# Patient Record
Sex: FEMALE | Race: WHITE | Employment: UNEMPLOYED | URBAN - METROPOLITAN AREA
[De-identification: names, ages, dates, MRNs, and addresses within clinical notes are randomized per-mention and may not be internally consistent; named-entity substitution may affect disease eponyms.]

---

## 2017-05-11 ENCOUNTER — OFFICE VISIT (OUTPATIENT)
Dept: OBGYN CLINIC | Age: 27
End: 2017-05-11

## 2017-05-11 VITALS
BODY MASS INDEX: 22.28 KG/M2 | SYSTOLIC BLOOD PRESSURE: 96 MMHG | DIASTOLIC BLOOD PRESSURE: 60 MMHG | WEIGHT: 118 LBS | RESPIRATION RATE: 19 BRPM | HEIGHT: 61 IN

## 2017-05-11 DIAGNOSIS — Z01.419 WELL FEMALE EXAM WITH ROUTINE GYNECOLOGICAL EXAM: Primary | ICD-10-CM

## 2017-05-11 DIAGNOSIS — D72.829 LEUKOCYTOSIS, UNSPECIFIED TYPE: ICD-10-CM

## 2017-05-11 RX ORDER — ESCITALOPRAM OXALATE 10 MG/1
10 TABLET ORAL DAILY
Status: ON HOLD | COMMUNITY
End: 2019-08-22

## 2017-05-11 NOTE — MR AVS SNAPSHOT
Visit Information Date & Time Provider Department Dept. Phone Encounter #  
 5/11/2017  1:00  S Daniel Colin, [address] Ricki Ferrera [phone] [id number] Upcoming Health Maintenance Date Due INFLUENZA AGE 9 TO ADULT 8/1/2017 PAP AKA CERVICAL CYTOLOGY 5/26/2019 Allergies as of 5/11/2017  Review Complete On: 5/11/2017 By: Sharon Matos No Known Allergies Current Immunizations  Reviewed on 9/4/2016 Name Date Tdap 7/8/2016, 12/6/2013 Not reviewed this visit You Were Diagnosed With   
  
 Codes Comments Well female exam with routine gynecological exam    -  Primary ICD-10-CM: U58.913 ICD-9-CM: V72.31 Vitals BP Resp Height(growth percentile) Weight(growth percentile) LMP BMI  
 96/60 (BP 1 Location: Left arm, BP Patient Position: Sitting) 19 5' 1\" (1.549 m) 118 lb (53.5 kg) 05/04/2017 22.3 kg/m2 OB Status Smoking Status Having regular periods Current Every Day Smoker BMI and BSA Data Body Mass Index Body Surface Area  
 22.3 kg/m 2 1.52 m 2 Preferred Pharmacy Pharmacy Name Phone [facility] [place], [address] [place] AT [facility] [phone] Your Updated Medication List  
  
   
This list is accurate as of: 5/11/17  1:19 PM.  Always use your most recent med list.  
  
  
  
  
 IRON (FERROUS SULFATE) PO Take  by mouth. LEXAPRO 10 mg tablet Generic drug:  escitalopram oxalate Take 10 mg by mouth daily. NEXPLANON 68 mg Impl Generic drug:  etonogestrel  
by SubDERmal route. norethindrone-ethinyl estradiol-iron 1.5 mg-30 mcg (21)/75 mg (7) Tab Commonly known as:  LOESTRIN FE 1.5/30 (28-DAY) Take 1 Tab by mouth daily. prenatal multivit-ca-min-fe-fa Tab Take  by mouth. WELLBUTRIN  mg SR tablet Generic drug:  buPROPion SR Take  by mouth two (2) times a day. XANAX 0.25 mg tablet Generic drug:  ALPRAZolam  
Take  by mouth. Introducing [facility]! Dear Litzy Cohn: Thank you for requesting a Rysto account. Our records indicate that you already have an active Rysto account. You can access your account anytime at [email or web address] Did you know that you can access your hospital and ER discharge instructions at any time in Rysto? You can also review all of your test results from your hospital stay or ER visit. Additional Information If you have questions, please visit the Frequently Asked Questions section of the Rysto website at [email or web address] Remember, Rysto is NOT to be used for urgent needs. For medical emergencies, dial 911. Now available from your iPhone and Android! Please provide this summary of care documentation to your next provider. Your primary care clinician is [facility]. If you have any questions after today's visit, please call [phone].

## 2017-05-11 NOTE — PROGRESS NOTES
Lillian Valerio is a ,  31 y.o. female [facility] whose No LMP recorded. Patient is not currently having periods (Reason: Nexplanon). was on 2017 who presents for her annual checkup. She is having no significant problems. With regard to the Gardisil vaccine, she is older than the FDA approved age to receive it. Menstrual status:    Her periods are spotting in flow. She is using three to five pads or tampons per day, irregular due to the nexplanon. She denies dysmenorrhea. She reports no premenstrual symptoms. Contraception:    The current method of family planning is nexplanon. Sexual history:    She  reports that she does not currently engage in sexual activity but has had male partners.; She reports using the following method of birth control/protection: None. Medical conditions:    Since her last annual GYN exam about one year ago, she has not the following changes in her health history:   Had leukocytosis during last pregnancy. Has not been rechecked postpartum. Pt's mom dx'd with SLE. Pap and Mammogram History:    Her most recent Pap smear was negative and HPV negative obtained 1 year(s) ago. The patient has never had a mammogram.    The patient does not have a family history of breast cancer. Past Medical History:   Diagnosis Date    Allergic to cats     Anemia     Anxiety and depression     managed by PCP    Disease of blood and blood forming organ     GERD (gastroesophageal reflux disease)     Postpartum depression     Screening for malignant neoplasm of the cervix 16    Negative (no hpv)    Stress     Susceptible to varicella (non-immune), currently pregnant 2016    vaccinate PP     Past Surgical History:   Procedure Laterality Date    HX WISDOM TEETH EXTRACTION      Also gum graph       Current Outpatient Prescriptions   Medication Sig Dispense Refill    etonogestrel (NEXPLANON) 68 mg impl by SubDERmal route.       escitalopram oxalate (LEXAPRO) 10 mg tablet Take 10 mg by mouth daily.  ALPRAZolam (XANAX) 0.25 mg tablet Take  by mouth.  IRON, FERROUS SULFATE, PO Take  by mouth.  prenatal multivit-ca-min-fe-fa tab Take  by mouth.  buPROPion SR (WELLBUTRIN SR) 150 mg SR tablet Take  by mouth two (2) times a day.  norethindrone-ethinyl estradiol-iron (LOESTRIN FE 1.5/30, 28-DAY,) 1.5 mg-30 mcg (21)/75 mg (7) tab Take 1 Tab by mouth daily. 84 Tab 2     Allergies: Review of patient's allergies indicates no known allergies. Social History     Social History    Marital status: SINGLE     Spouse name: N/A    Number of children: N/A    Years of education: N/A     Occupational History    Not on file. Social History Main Topics    Smoking status: Current Every Day Smoker     Packs/day: 0.25     Years: 11.00     Types: Cigarettes    Smokeless tobacco: Never Used      Comment: Smokes less than 10 cigs qd - Never used vapor or e-cigs     Alcohol use No    Drug use: No    Sexual activity: Not Currently     Partners: Male     Birth control/ protection: None     Other Topics Concern    Not on file     Social History Narrative     Tobacco History:  reports that she has been smoking Cigarettes. She has a 2.75 pack-year smoking history. She has never used smokeless tobacco.  Alcohol Abuse:  reports that she does not drink alcohol. Drug Abuse:  reports that she does not use illicit drugs.     Patient Active Problem List   Diagnosis Code    Leukocytosis D72.829       OB History    Para Term  AB SAB TAB Ectopic Multiple Living   2 2 2      0 2      # Outcome Date GA Lbr Adebayo/2nd Weight Sex Delivery Anes PTL Lv   2 Term 16 40w4d  6 lb 5.8 oz (2.885 kg) F Vag-Spont EPIDURAL AN N Y   1 Term 14 39w3d 13:50 / 01:03 6 lb 7.7 oz (2.94 kg) M VAGINAL DELI EPIDURAL AN  Y            Review of Systems - History obtained from the patient  Constitutional: negative for weight loss, fever, night sweats  HEENT: negative for hearing loss, earache, congestion, snoring, sorethroat  CV: negative for chest pain, palpitations, edema  Resp: negative for cough, shortness of breath, wheezing  GI: negative for change in bowel habits, abdominal pain, black or bloody stools  : negative for frequency, dysuria, hematuria, vaginal discharge  MSK: negative for back pain, joint pain, muscle pain  Breast: negative for breast lumps, nipple discharge, galactorrhea  Skin :negative for itching, rash, hives  Neuro: negative for dizziness, headache, confusion, weakness  Psych: negative for anxiety, depression, change in mood  Heme/lymph: negative for bleeding, bruising, pallor    Physical Exam    Visit Vitals    BP 96/60 (BP 1 Location: Left arm, BP Patient Position: Sitting)    Resp 19    Ht 5' 1\" (1.549 m)    Wt 118 lb (53.5 kg)    LMP 05/04/2017    BMI 22.3 kg/m2       Constitutional  · Appearance: well-nourished, well developed, alert, in no acute distress    HENT  · Head and Face: appears normal    Neck  · Inspection/Palpation: normal appearance, no masses or tenderness  · Lymph Nodes: no lymphadenopathy present    Chest  · Respiratory Effort: breathing normal    Breasts  · Inspection of Breasts: breasts symmetrical, no skin changes, no discharge present, nipple appearance normal, no skin retraction present  · Palpation of Breasts and Axillae: no masses present on palpation, no breast tenderness  · Axillary Lymph Nodes: no lymphadenopathy present    Extremities:  nexplanon palp LUE in proper position    Gastrointestinal  · Abdominal Examination: abdomen non-tender to palpation, normal bowel sounds, no masses present  · Liver and spleen: no hepatomegaly present, spleen not palpable  · Hernias: no hernias identified    Genitourinary  · External Genitalia: normal appearance for age, no discharge present, no tenderness present, no inflammatory lesions present, no masses present, no atrophy present  · Vagina: normal vaginal vault without central or paravaginal defects, no discharge present, no inflammatory lesions present, no masses present; minimal dark blood. · Bladder: non-tender to palpation  · Urethra: appears normal  · Cervix: normal   · Uterus: normal size, shape and consistency; RV  · Adnexa: no adnexal tenderness present, no adnexal masses present  · Perineum: perineum within normal limits, no evidence of trauma, no rashes or skin lesions present  · Anus: anus within normal limits, no hemorrhoids present  · Inguinal Lymph Nodes: no lymphadenopathy present    Skin  · General Inspection: no rash, no lesions identified    Neurologic/Psychiatric  · Mental Status:  · Orientation: grossly oriented to person, place and time  · Mood and Affect: mood normal, affect appropriate    . Assessment:  Routine gynecologic examination. Neg pap 5/2016  Her current medical status is satisfactory with no evidence of significant gynecologic issues.   Doing well with Nexplanon  H/o leukocytosis    Plan:  Counseled re: diet, exercise, healthy lifestyle  Return for yearly wellness visits  CBC with diff today

## 2017-05-12 LAB
BASOPHILS # BLD AUTO: 0.1 X10E3/UL (ref 0–0.2)
BASOPHILS NFR BLD AUTO: 1 %
EOSINOPHIL # BLD AUTO: 1.8 X10E3/UL (ref 0–0.4)
EOSINOPHIL NFR BLD AUTO: 16 %
ERYTHROCYTE [DISTWIDTH] IN BLOOD BY AUTOMATED COUNT: 13.7 % (ref 12.3–15.4)
HCT VFR BLD AUTO: 41.3 % (ref 34–46.6)
HGB BLD-MCNC: 13.4 G/DL (ref 11.1–15.9)
IMM GRANULOCYTES # BLD: 0 X10E3/UL (ref 0–0.1)
IMM GRANULOCYTES NFR BLD: 0 %
LYMPHOCYTES # BLD AUTO: 4 X10E3/UL (ref 0.7–3.1)
LYMPHOCYTES NFR BLD AUTO: 37 %
MCH RBC QN AUTO: 29 PG (ref 26.6–33)
MCHC RBC AUTO-ENTMCNC: 32.4 G/DL (ref 31.5–35.7)
MCV RBC AUTO: 89 FL (ref 79–97)
MONOCYTES # BLD AUTO: 0.7 X10E3/UL (ref 0.1–0.9)
MONOCYTES NFR BLD AUTO: 6 %
NEUTROPHILS # BLD AUTO: 4.3 X10E3/UL (ref 1.4–7)
NEUTROPHILS NFR BLD AUTO: 40 %
PLATELET # BLD AUTO: 213 X10E3/UL (ref 150–379)
RBC # BLD AUTO: 4.62 X10E6/UL (ref 3.77–5.28)
WBC # BLD AUTO: 11 X10E3/UL (ref 3.4–10.8)

## 2017-06-07 RX ORDER — TERCONAZOLE 4 MG/G
1 CREAM VAGINAL
Qty: 45 G | Refills: 0 | Status: SHIPPED | OUTPATIENT
Start: 2017-06-07 | End: 2018-05-14 | Stop reason: ALTCHOICE

## 2017-07-10 ENCOUNTER — OFFICE VISIT (OUTPATIENT)
Dept: OBGYN CLINIC | Age: 27
End: 2017-07-10

## 2017-07-10 VITALS
HEART RATE: 89 BPM | SYSTOLIC BLOOD PRESSURE: 122 MMHG | DIASTOLIC BLOOD PRESSURE: 73 MMHG | HEIGHT: 61 IN | WEIGHT: 114 LBS | BODY MASS INDEX: 21.52 KG/M2

## 2017-07-10 DIAGNOSIS — Z30.46 ENCOUNTER FOR NEXPLANON REMOVAL: Primary | ICD-10-CM

## 2017-07-10 DIAGNOSIS — Z30.09 COUNSELING FOR BIRTH CONTROL, ORAL CONTRACEPTIVES: ICD-10-CM

## 2017-07-10 DIAGNOSIS — Z32.02 NEGATIVE PREGNANCY TEST: ICD-10-CM

## 2017-07-10 LAB
HCG URINE, QL. (POC): NEGATIVE
VALID INTERNAL CONTROL?: YES

## 2017-07-10 RX ORDER — NORETHINDRONE ACETATE AND ETHINYL ESTRADIOL 1.5-30(21)
1 KIT ORAL DAILY
Qty: 3 PACKAGE | Refills: 3 | Status: SHIPPED | OUTPATIENT
Start: 2017-07-10 | End: 2018-05-14 | Stop reason: SDUPTHER

## 2017-07-10 NOTE — PATIENT INSTRUCTIONS
Learning About Birth Control: The Shot  What is the shot? The shot is used to prevent pregnancy. You get the shot in your upper arm or rear end (buttocks). The shot gives you a dose of the hormone progestin. The shot is often called by its brand name, Depo-Provera. Progestin prevents pregnancy in these ways: It thickens the mucus in the cervix. This makes it hard for sperm to travel into the uterus. It also thins the lining of the uterus, which makes it harder for a fertilized egg to attach to the uterus. Progestin can sometimes stop the ovaries from releasing an egg each month (ovulation). The shot provides birth control for 3 months at a time. You then need another shot. The shot can cause bone loss. Most women can use it safely for up to 2 years and then may choose to switch to another form of birth control. Some women may be able to use the shot for more than 2 years. How well does it work? In the first year of use:  · When the shot is used exactly as directed, fewer than 1 woman out of 100 has an unplanned pregnancy. · When the shot is not used exactly as directed, 6 women out of 100 have an unplanned pregnancy. Be sure to tell your doctor about any health problems you have or medicines you take. He or she can help you choose the birth control method that is right for you. What are the advantages of the shot? · The shot is one of the most effective methods of birth control. · It's convenient. You need to get a shot only once every 3 months to prevent pregnancy. You don't have to interrupt sex to protect against pregnancy. · It prevents pregnancy for 3 months at a time. You don't have to worry about birth control for this time. · It's safe to use while breastfeeding. · The shot may reduce heavy bleeding and cramping. · The shot doesn't contain estrogen. So you can use it if you don't want to take estrogen or can't take estrogen because you have certain health problems or concerns.   What are the disadvantages of the shot? · The shot doesn't protect against sexually transmitted infections (STIs), such as herpes or HIV/AIDS. If you aren't sure if your sex partner might have an STI, use a condom to protect against disease. · The shot may cause bone loss in some women. You shouldn't use this method for more than 2 years without talking to your doctor first about the risks and benefits. · Any side effects may last up to 3 months. ¨ The shot may cause irregular periods, or you may have spotting between periods. You may also stop getting a period. Some women see having no period as an advantage. ¨ It may cause mood changes, less interest in sex, or weight gain. · You must go to the doctor every 3 months to get the shot. · If you want to get pregnant, it may take 9 to 10 months after you stop getting the shot. This is because the hormones the shot provided have to leave your system, and your body has to readjust.  · If you have severe side effects, you have to wait for the hormones to get out of your system. This may take up to 3 months. Where can you learn more? Go to http://nolan-ibrahima.info/. Enter U806 in the search box to learn more about \"Learning About Birth Control: The Shot. \"  Current as of: March 16, 2017  Content Version: 11.3  © 9851-5665 [employer]wise, [employer]orporated. Care instructions adapted under license by [employer] (which disclaims liability or warranty for this information). If you have questions about a medical condition or this instruction, always ask your healthcare professional. [place] [address] any warranty or liability for your use of this information.

## 2017-07-10 NOTE — PROGRESS NOTES
Problem Visit-Complete    Chief Complaint   Implanon Removal (Nexplanon)      CHARLEY Miller is a 28 y.o. female who presents for the evaluation of Nexplanon. No LMP recorded. Patient has had an implant. Nexplanon placed 11/2016. The patient complains of non-stop bleeding and per her EX, reports patient has changed and become ''crazy\". Fighting a custody jacobs and EX desires for patient to have Nexplanon removed. He thinks it is the reason of their \"constant drama\". The symptoms are moderate - severe. Associated symptoms: none. Aggravating factors: none. Alleviating factors: none. Would like OCPs for cycle control. Past Medical History:   Diagnosis Date    Allergic to cats     Anemia     Anxiety and depression     managed by PCP    Disease of blood and blood forming organ     GERD (gastroesophageal reflux disease)     Postpartum depression     Screening for malignant neoplasm of the cervix 5/26/16    Negative (no hpv)    Stress     Susceptible to varicella (non-immune), currently pregnant 6/2016    vaccinate PP     Past Surgical History:   Procedure Laterality Date    HX WISDOM TEETH EXTRACTION  2012    Also gum graph     Social History     Occupational History    Not on file. Social History Main Topics    Smoking status: Current Every Day Smoker     Packs/day: 1.00     Years: 11.00     Types: Cigarettes    Smokeless tobacco: Never Used      Comment: Never used vapor or e-cigs     Alcohol use No    Drug use: No    Sexual activity: Yes     Partners: Male     Birth control/ protection: Rhythm, Implant     Family History   Problem Relation Age of Onset    Heart Attack Mother     Lupus Mother     Heart Attack Father      x 2    Heart Disease Father     Hypertension Father        No Known Allergies  Prior to Admission medications    Medication Sig Start Date End Date Taking? Authorizing Provider   escitalopram oxalate (LEXAPRO) 10 mg tablet Take 10 mg by mouth daily. Yes Historical Provider   ALPRAZolam (XANAX) 0.25 mg tablet Take  by mouth. Yes Historical Provider   IRON, FERROUS SULFATE, PO Take  by mouth. Yes Historical Provider   terconazole (TERAZOL 7) 0.4 % vaginal cream Insert 1 Applicator into vagina nightly. 6/7/17   Adiel Ramos MD   etonogestrel (NEXPLANON) 68 mg impl by SubDERmal route. Historical Provider   buPROPion SR [facility] SR) 150 mg SR tablet Take  by mouth two (2) times a day. Historical Provider   norethindrone-ethinyl estradiol-iron (LOESTRIN FE 1.5/30, 28-DAY,) 1.5 mg-30 mcg (21)/75 mg (7) tab Take 1 Tab by mouth daily. 10/20/16   Adiel Ramos MD   prenatal multivit-ca-min-fe-fa tab Take  by mouth. Historical Provider            Objective:  Visit Vitals    /73    Pulse 89    Ht 5' 1\" (1.549 m)    Wt 114 lb (51.7 kg)    Breastfeeding No    BMI 21.54 kg/m2       Physical Exam:     Constitutional  · Appearance: well-nourished, well developed, alert, in no acute distress    HENT  · Head and Face: appears normal      Breasts  · Inspection of Breasts: breasts symmetrical, no skin changes, no discharge present, nipple appearance normal, no skin retraction present  · Palpation of Breasts and Axillae: no masses present on palpation, no breast tenderness  · Axillary Lymph Nodes: no lymphadenopathy present    Extremities  · nexplanon palpated LUE    Skin  · General Inspection: no rash, no lesions identified    Neurologic/Psychiatric  · Mental Status:  · Orientation: grossly oriented to person, place and time  · Mood and Affect: mood normal, affect appropriate    Assessment:  Desires removal of Nexplanon  Would like OCPs for menstrual control. Has been on LE 1.5/30 in past without problems. Plan:   Nexplanon removed  eRX LE 1.5/30 #3 RFx3. Can begin today. Backup method until second pack. Risk and benefits reviewed, including thromboembolic events. Handout given.   AE 5/2018      Orders Placed This Encounter    AMB POC URINE PREGNANCY TEST, VISUAL COLOR COMPARISON    norethindrone-ethinyl estradiol-iron (LOESTRIN FE 1.5/30, 28-DAY,) 1.5 mg-30 mcg (21)/75 mg (7) tab     Sig: Take 1 Tab by mouth daily. Dispense:  3 Package     Refill:  3     . [facility]  OFFICE PROCEDURE PROGRESS NOTE        Chart reviewed for the following:   Noelle Perales, have reviewed the History, Physical and updated the Allergic reactions for Slipager 70 performed immediately prior to start of procedure:   Darion MCCORD, have performed the following reviews on [address] [facility] prior to the start of the procedure:            * Patient was identified by name and date of birth   * Agreement on procedure being performed was verified  * Risks and Benefits explained to the patient  * Procedure site verified and marked as necessary  * Patient was positioned for comfort  * Consent was signed and verified     Time: 976      Date of procedure: 7/10/2017    Procedure performed by:  Susan Rivas MD    Provider assisted by: Yefri Ramon MA    Patient assisted by: partner    How tolerated by patient: tolerated the procedure well with no complications    Post Procedural Pain Scale: 0 - No Hurt    Comments: none          Procedure note: Nexplanon/Implanon removal    [address] [facility] is a ,  28 y.o. female whose No LMP recorded. Patient has had an implant. .  She presents for office removal of a NEXPLANON/IMPLANON sub-dermal contraceptive implant. Procedure:  She was positioned so the site of her implant was visable and easily accessible. The implant was located by palpation. The end of the implant nearest the elbow was marked with a sterile marker. The area was prepped with an alcohol wipe and infiltrated with 3cc 1% lidocaine. Adequate time was allowed for anesthesia to take affect. The operative site was cleansed with Betadine and draped in a sterile fashion.     Downward pressure was applied on the end of the implant nearest the axilla and a 2-3mm incision was made in the longitudinal direction of the arm at the tip of the implant closest to the elbow. The implant was then pushed gently toward the incision until the tip was visible. The fibrous capsule was opened with a combination of blunt and sharp dissection. The implant was grasped with mosquito forceps and removed intact. It measured a full 4 cm in length. The skin was cleansed and dried. A steristrip was applied then topped with a folded 4x4 gauze and a Curlex pressure dressing. There were no complication or problems. She demonstrated full active range of movement of her elbow, wrist, all five digits and denied numbness and tingling. Post-procedure:  She was told to remove the dressing in 12-24 hours, to keep the incision area dry for 24 hours and to remove the Steristrip in 5-7 days. She was given our 24-hour phone number and encouraged to call if there are any problems. See progress note for family planning consultation.

## 2017-07-17 ENCOUNTER — PATIENT MESSAGE (OUTPATIENT)
Dept: OBGYN CLINIC | Age: 27
End: 2017-07-17

## 2017-07-18 ENCOUNTER — OFFICE VISIT (OUTPATIENT)
Dept: OBGYN CLINIC | Age: 27
End: 2017-07-18

## 2017-07-18 ENCOUNTER — PATIENT MESSAGE (OUTPATIENT)
Dept: OBGYN CLINIC | Age: 27
End: 2017-07-18

## 2017-07-18 VITALS
WEIGHT: 114 LBS | HEART RATE: 88 BPM | BODY MASS INDEX: 21.52 KG/M2 | SYSTOLIC BLOOD PRESSURE: 118 MMHG | HEIGHT: 61 IN | DIASTOLIC BLOOD PRESSURE: 62 MMHG

## 2017-07-18 DIAGNOSIS — N89.8 VAGINAL DISCHARGE: ICD-10-CM

## 2017-07-18 DIAGNOSIS — N90.89 VULVAR LESION: Primary | ICD-10-CM

## 2017-07-18 DIAGNOSIS — R39.89 BLADDER PAIN: ICD-10-CM

## 2017-07-18 LAB
PH BODY FLUID, POCT, PHBFPOCT: NORMAL
SOURCE POCT, SRCEPOCT: NORMAL
WET MOUNT POCT, WMPOCT: NEGATIVE

## 2017-07-18 RX ORDER — VALACYCLOVIR HYDROCHLORIDE 1 G/1
1000 TABLET, FILM COATED ORAL 2 TIMES DAILY
Qty: 20 TAB | Refills: 0 | Status: SHIPPED | OUTPATIENT
Start: 2017-07-18 | End: 2017-07-28

## 2017-07-18 NOTE — PROGRESS NOTES
Vaginitis evaluation    Chief Complaint   Vaginitis      HPI  28 y.o. female complains of white and creamy vaginal discharge for 8 days. No LMP recorded. She has additional symptoms at this time - red, swollen and in a lot of pain. Burns when urine hits the skin. The patient denies aggravating factors. She is not concerned about possible STI exposure at this time. She denies exposure to new chemicals ot hygenic agents  Previous treatment included: Terazol and monistat, no relief  Was on amoxicillin for tooth infection    No fever. No vaginal itching. Past Medical History:   Diagnosis Date    Allergic to cats     Anemia     Anxiety and depression     managed by PCP    Disease of blood and blood forming organ     GERD (gastroesophageal reflux disease)     Postpartum depression     Screening for malignant neoplasm of the cervix 5/26/16    Negative (no hpv)    Stress     Susceptible to varicella (non-immune), currently pregnant 6/2016    vaccinate PP     Past Surgical History:   Procedure Laterality Date    HX WISDOM TEETH EXTRACTION  2012    Also gum graph     Social History     Occupational History    Not on file. Social History Main Topics    Smoking status: Current Every Day Smoker     Packs/day: 1.00     Years: 11.00     Types: Cigarettes    Smokeless tobacco: Never Used      Comment: Never used vapor or e-cigs     Alcohol use No    Drug use: No    Sexual activity: Yes     Partners: Male     Birth control/ protection: Pill     Family History   Problem Relation Age of Onset    Heart Attack Mother     Lupus Mother     Heart Attack Father      x 2    Heart Disease Father     Hypertension Father         No Known Allergies  Prior to Admission medications    Medication Sig Start Date End Date Taking? Authorizing Provider   valACYclovir (VALTREX) 1 gram tablet Take 1 Tab by mouth two (2) times a day for 10 days.  7/18/17 7/28/17 Yes [address] Daniel Gonzales MD   norethindrone-ethinyl estradiol-iron (LOESTRIN FE 1.5/30, 28-DAY,) 1.5 mg-30 mcg (21)/75 mg (7) tab Take 1 Tab by mouth daily. 7/10/17  Yes Jayjay S Daniel Gonzales MD   escitalopram oxalate (LEXAPRO) 10 mg tablet Take 10 mg by mouth daily. Yes Historical Provider   ALPRAZolam (XANAX) 0.25 mg tablet Take  by mouth. Yes Historical Provider   IRON, FERROUS SULFATE, PO Take  by mouth. Yes Historical Provider   terconazole (TERAZOL 7) 0.4 % vaginal cream Insert 1 Applicator into vagina nightly. 6/7/17   [address] Daniel Gonzales MD   buPROPion SR [facility]) 150 mg SR tablet Take  by mouth two (2) times a day. Historical Provider   prenatal multivit-ca-min-fe-fa tab Take  by mouth.     Historical Provider                             Objective:    Visit Vitals    /62    Pulse 88    Ht 5' 1\" (1.549 m)    Wt 114 lb (51.7 kg)    Breastfeeding No    BMI 21.54 kg/m2       Physical Exam:   PHYSICAL EXAMINATION    Constitutional  · Appearance: well-nourished, well developed, alert, in no acute distress    HENT  · Head and Face: appears normal    Genitourinary  · External Genitalia:            · Vagina:  Scant white discharge present, otherwise normal vaginal vault without central or paravaginal defects, no inflammatory lesions present, no masses present  · Bladder: mildly tender to palpation  · Urethra: appears normal  · Cervix: normal   · Uterus: normal size, shape and consistency  · Adnexa: no adnexal tenderness present, no adnexal masses present  · Perineum: perineum within normal limits, no evidence of trauma, no rashes or skin lesions present  · Anus: anus within normal limits, no hemorrhoids present  · Inguinal Lymph Nodes: no lymphadenopathy present    Skin  · General Inspection: no rash, no lesions identified    Neurologic/Psychiatric  · Mental Status:  · Orientation: grossly oriented to person, place and time  · Mood and Affect: mood normal, affect appropriate          Results for orders placed or performed in visit on 07/18/17   AMB POC SMEAR, STAIN & INTERPRET, WET MOUNT   Result Value Ref Range    Wet mount (POC) negative     Narrative    WP/DERRICK    Hypae: negative  Buds: negative  Whiff: negative    Wet Prep:  Trich: negative  Clue cells: negative  Hyphae: negative  Buds: negative  WBC's: rare     AMB POC PH, BODY FLUID EXCEPT BLOOD   Result Value Ref Range    pH,Body fluid (POC) </= 4.5     Source         Assessment:   Vulvar lesions - suspect HSV  Bladder tenderness, mild  Vaginal discharge, scant    Plan:   - vulvar swab for HSV  - eRX Valtrex 1gm BID x7-10d  - RX written for lidocaine jelly  - HSV handouts given  - briefly discussed Valtrex for suppression vs episodic  - can schedule f/u for 2wks  - ur cx  - NuSwab Plus    Orders Placed This Encounter    CULTURE, URINE    HERPES SIMPLEX VIRUS (HSV) MIRI    NUSWAB VAGINITIS PLUS    AMB POC SMEAR, STAIN & INTERPRET, WET MOUNT    AMB POC PH, BODY FLUID EXCEPT BLOOD    valACYclovir (VALTREX) 1 gram tablet     Sig: Take 1 Tab by mouth two (2) times a day for 10 days.      Dispense:  20 Tab     Refill:  0

## 2017-07-19 NOTE — TELEPHONE ENCOUNTER
Leslie with [facility] calling regarding lidocaine jelly strength as the rx was hand written. Confirmed with nurse of DM 2%. Leslie notified.

## 2017-07-20 LAB
BACTERIA UR CULT: NO GROWTH
HSV1 DNA SPEC QL NAA+PROBE: NEGATIVE
HSV2 DNA SPEC QL NAA+PROBE: POSITIVE

## 2017-07-22 LAB
A VAGINAE DNA VAG QL NAA+PROBE: NORMAL SCORE
BVAB2 DNA VAG QL NAA+PROBE: NORMAL SCORE
C ALBICANS DNA VAG QL NAA+PROBE: NEGATIVE
C GLABRATA DNA VAG QL NAA+PROBE: NEGATIVE
C TRACH RRNA SPEC QL NAA+PROBE: NEGATIVE
MEGA1 DNA VAG QL NAA+PROBE: NORMAL SCORE
N GONORRHOEA RRNA SPEC QL NAA+PROBE: NEGATIVE
T VAGINALIS RRNA SPEC QL NAA+PROBE: NEGATIVE

## 2017-07-25 ENCOUNTER — OFFICE VISIT (OUTPATIENT)
Dept: OBGYN CLINIC | Age: 27
End: 2017-07-25

## 2017-07-25 VITALS
WEIGHT: 114 LBS | HEIGHT: 61 IN | DIASTOLIC BLOOD PRESSURE: 63 MMHG | BODY MASS INDEX: 21.52 KG/M2 | SYSTOLIC BLOOD PRESSURE: 115 MMHG | HEART RATE: 75 BPM

## 2017-07-25 DIAGNOSIS — A60.04 HERPES SIMPLEX VULVOVAGINITIS: Primary | ICD-10-CM

## 2017-07-25 RX ORDER — VALACYCLOVIR HYDROCHLORIDE 1 G/1
1000 TABLET, FILM COATED ORAL DAILY
Qty: 30 TAB | Refills: 6 | Status: SHIPPED | OUTPATIENT
Start: 2017-07-25 | End: 2017-08-11 | Stop reason: SDUPTHER

## 2017-07-25 NOTE — PROGRESS NOTES
Vaginitis evaluation    Chief Complaint   Vaginitis      HPI  28 y.o. female complains of lesions on buttocks. .Patient's last menstrual period was 07/22/2017. Seen last week for primary HSV infection. NuSwab confirmed HSV-2. Given RX for Valtrex 1mg BID x10d, still taking. Vulvar lesions have improved, but now with scattered lesions over buttocks - she can't see them, wondering if they are also HSV. Lesions are painful. Has been using lidocaine jelly and sitz baths which give only partial relief. Past Medical History:   Diagnosis Date    Allergic to cats     Anemia     Anxiety and depression     managed by PCP    Disease of blood and blood forming organ     GERD (gastroesophageal reflux disease)     Postpartum depression     Screening for malignant neoplasm of the cervix 5/26/16    Negative (no hpv)    Stress     Susceptible to varicella (non-immune), currently pregnant 6/2016    vaccinate PP     Past Surgical History:   Procedure Laterality Date    HX WISDOM TEETH EXTRACTION  2012    Also gum graph     Social History     Occupational History    Not on file. Social History Main Topics    Smoking status: Current Every Day Smoker     Packs/day: 1.00     Years: 11.00     Types: Cigarettes    Smokeless tobacco: Never Used      Comment: Never used vapor or e-cigs     Alcohol use No    Drug use: No    Sexual activity: Yes     Partners: Male     Birth control/ protection: Pill     Family History   Problem Relation Age of Onset    Heart Attack Mother     Lupus Mother     Heart Attack Father      x 2    Heart Disease Father     Hypertension Father         No Known Allergies  Prior to Admission medications    Medication Sig Start Date End Date Taking? Authorizing Provider   valACYclovir (VALTREX) 1 gram tablet Take 1 Tab by mouth two (2) times a day for 10 days.  7/18/17 7/28/17 Yes Marika Sapp MD   norethindrone-ethinyl estradiol-iron (LOESTRIN FE 1.5/30, 28-DAY,) 1.5 mg-30 mcg (21)/75 mg (7) tab Take 1 Tab by mouth daily. 7/10/17  Yes [address] Daniel Gonzales MD   ALPRAZolam [facility]) 0.25 mg tablet Take  by mouth. Yes Historical Provider   IRON, FERROUS SULFATE, PO Take  by mouth. Yes Historical Provider   terconazole (TERAZOL 7) 0.4 % vaginal cream Insert 1 Applicator into vagina nightly. 6/7/17   [address] Daniel Gonzales MD   escitalopram oxalate (LEXAPRO) 10 mg tablet Take 10 mg by mouth daily. Historical Provider   buPROPion SR [facility] SR) 150 mg SR tablet Take  by mouth two (2) times a day. Historical Provider   prenatal multivit-ca-min-fe-fa tab Take  by mouth. Historical Provider                             Objective:    Visit Vitals    /63    Pulse 75    Ht 5' 1\" (1.549 m)    Wt 114 lb (51.7 kg)    LMP 07/22/2017    Breastfeeding No    BMI 21.54 kg/m2       Physical Exam:   PHYSICAL EXAMINATION    Constitutional  · Appearance: well-nourished, well developed, alert, in no acute distress    HENT  · Head and Face: appears normal    Genitourinary  · Scattered shallow ulcerative lesions over buttocks bilaterally c/w HSV. Vulvar lesions nearly completely healed      Neurologic/Psychiatric  · Mental Status:  · Orientation: grossly oriented to person, place and time  · Mood and Affect: mood normal, affect appropriate        Assessment:   Primary HSV    Plan:   - complete current course of Valtrex BID  - discussed HSV transmission, natural course, recurrent outbreaks, concerns with pregnancy, episodic vs suppresive tx  - will continue valtrex daily for suppression once completes BID course. Orders Placed This Encounter    valACYclovir (VALTREX) 1 gram tablet     Sig: Take 1 Tab by mouth daily. Dispense:  30 Tab     Refill:  6     .

## 2017-07-25 NOTE — PATIENT INSTRUCTIONS

## 2017-08-11 RX ORDER — VALACYCLOVIR HYDROCHLORIDE 1 G/1
TABLET, FILM COATED ORAL
Qty: 90 TAB | Refills: 3 | Status: SHIPPED | OUTPATIENT
Start: 2017-08-11 | End: 2018-05-14 | Stop reason: DRUGHIGH

## 2017-10-30 ENCOUNTER — APPOINTMENT (OUTPATIENT)
Dept: CT IMAGING | Age: 27
End: 2017-10-30
Attending: EMERGENCY MEDICINE
Payer: MEDICAID

## 2017-10-30 ENCOUNTER — HOSPITAL ENCOUNTER (EMERGENCY)
Age: 27
Discharge: HOME | End: 2017-10-30
Attending: STUDENT IN AN ORGANIZED HEALTH CARE EDUCATION/TRAINING PROGRAM | Admitting: EMERGENCY MEDICINE
Payer: MEDICAID

## 2017-10-30 VITALS
DIASTOLIC BLOOD PRESSURE: 75 MMHG | TEMPERATURE: 97.9 F | HEART RATE: 94 BPM | SYSTOLIC BLOOD PRESSURE: 112 MMHG | OXYGEN SATURATION: 98 % | WEIGHT: 105 LBS | HEIGHT: 61 IN | RESPIRATION RATE: 18 BRPM | BODY MASS INDEX: 19.83 KG/M2

## 2017-10-30 DIAGNOSIS — M54.50 BILATERAL LOW BACK PAIN WITHOUT SCIATICA, UNSPECIFIED CHRONICITY: Primary | ICD-10-CM

## 2017-10-30 LAB
APPEARANCE UR: CLEAR
BACTERIA URNS QL MICRO: NEGATIVE /HPF
BILIRUB UR QL: NEGATIVE
COLOR UR: NORMAL
EPITH CASTS URNS QL MICRO: NORMAL /LPF
GLUCOSE UR STRIP.AUTO-MCNC: NEGATIVE MG/DL
HCG UR QL: NEGATIVE
HGB UR QL STRIP: NEGATIVE
HYALINE CASTS URNS QL MICRO: NORMAL /LPF (ref 0–5)
KETONES UR QL STRIP.AUTO: NEGATIVE MG/DL
LEUKOCYTE ESTERASE UR QL STRIP.AUTO: NEGATIVE
NITRITE UR QL STRIP.AUTO: NEGATIVE
PH UR STRIP: 5.5 [PH] (ref 5–8)
PROT UR STRIP-MCNC: NEGATIVE MG/DL
RBC #/AREA URNS HPF: NORMAL /HPF (ref 0–5)
SP GR UR REFRACTOMETRY: 1.02 (ref 1–1.03)
UR CULT HOLD, URHOLD: NORMAL
UROBILINOGEN UR QL STRIP.AUTO: 0.2 EU/DL (ref 0.2–1)
WBC URNS QL MICRO: NORMAL /HPF (ref 0–4)

## 2017-10-30 PROCEDURE — 74176 CT ABD & PELVIS W/O CONTRAST: CPT

## 2017-10-30 PROCEDURE — 81025 URINE PREGNANCY TEST: CPT

## 2017-10-30 PROCEDURE — 81001 URINALYSIS AUTO W/SCOPE: CPT | Performed by: STUDENT IN AN ORGANIZED HEALTH CARE EDUCATION/TRAINING PROGRAM

## 2017-10-30 PROCEDURE — 99283 EMERGENCY DEPT VISIT LOW MDM: CPT

## 2017-10-30 NOTE — ED NOTES
Pt c/o lower back pain x months. Has seen her PCP, Dr Slim Nino. Had xray and ultrasound. Given oxycodone. Also seen at [facility] 2 weeks ago. Given abx for UTI and percocet. Ran out of pain meds 3 days ago.

## 2017-10-30 NOTE — DISCHARGE INSTRUCTIONS
Back Pain: Care Instructions  Your Care Instructions    Back pain has many possible causes. It is often related to problems with muscles and ligaments of the back. It may also be related to problems with the nerves, discs, or bones of the back. Moving, lifting, standing, sitting, or sleeping in an awkward way can strain the back. Sometimes you don't notice the injury until later. Arthritis is another common cause of back pain. Although it may hurt a lot, back pain usually improves on its own within several weeks. Most people recover in 12 weeks or less. Using good home treatment and being careful not to stress your back can help you feel better sooner. Follow-up care is a key part of your treatment and safety. Be sure to make and go to all appointments, and call your doctor if you are having problems. It's also a good idea to know your test results and keep a list of the medicines you take. How can you care for yourself at home? · Sit or lie in positions that are most comfortable and reduce your pain. Try one of these positions when you lie down:  ¨ Lie on your back with your knees bent and supported by large pillows. ¨ Lie on the floor with your legs on the seat of a sofa or chair. [place] on your side with your knees and hips bent and a pillow between your legs. ¨ Lie on your stomach if it does not make pain worse. · Do not sit up in bed, and avoid soft couches and twisted positions. Bed rest can help relieve pain at first, but it delays healing. Avoid bed rest after the first day of back pain. · Change positions every 30 minutes. If you must sit for long periods of time, take breaks from sitting. Get up and walk around, or lie in a comfortable position. · Try using a heating pad on a low or medium setting for 15 to 20 minutes every 2 or 3 hours. Try a warm shower in place of one session with the heating pad. · You can also try an ice pack for 10 to 15 minutes every 2 to 3 hours.  Put a thin cloth between the ice pack and your skin. · Take pain medicines exactly as directed. ¨ If the doctor gave you a prescription medicine for pain, take it as prescribed. ¨ If you are not taking a prescription pain medicine, ask your doctor if you can take an over-the-counter medicine. · Take short walks several times a day. You can start with 5 to 10 minutes, 3 or 4 times a day, and work up to longer walks. Walk on level surfaces and avoid hills and stairs until your back is better. · Return to work and other activities as soon as you can. Continued rest without activity is usually not good for your back. · To prevent future back pain, do exercises to stretch and strengthen your back and stomach. Learn how to use good posture, safe lifting techniques, and proper body mechanics. When should you call for help? Call your doctor now or seek immediate medical care if:  ? · You have new or worsening numbness in your legs. ? · You have new or worsening weakness in your legs. (This could make it hard to stand up.)   ? · You lose control of your bladder or bowels. ? Watch closely for changes in your health, and be sure to contact your doctor if:  ? · Your pain gets worse. ? · You are not getting better after 2 weeks. Where can you learn more? Go to http://nolan-ibrahima.info/. Enter Q656 in the search box to learn more about \"Back Pain: Care Instructions. \"  Current as of: March 21, 2017  Content Version: 11.4  © 6759-2393 [employer]. Care instructions adapted under license by [employer] (which disclaims liability or warranty for this information). If you have questions about a medical condition or this instruction, always ask your healthcare professional. [place] [address] any warranty or liability for your use of this information. We hope that we have addressed all of your medical concerns.  The examination and treatment you received in the Emergency Department were for an emergent problem and were not intended as complete care. It is important that you follow up with your healthcare provider(s) for ongoing care. If your symptoms worsen or do not improve as expected, and you are unable to reach your usual health care provider(s), you should return to the Emergency Department. Today's healthcare is undergoing tremendous change, and patient satisfaction surveys are one of the many tools to assess the quality of medical care. You may receive a survey from the [employer] organization regarding your experience in the Emergency Department. I hope that your experience has been completely positive, particularly the medical care that I provided. As such, please participate in the survey; anything less than excellent does not meet my expectations or intentions. [facility] and [facility] participate in nationally recognized quality of care measures. If your blood pressure is greater than 120/80, as reported below, we urge that you seek medical care to address the potential of high blood pressure, commonly known as hypertension. Hypertension can be hereditary or can be caused by certain medical conditions, pain, stress, or \"white coat syndrome. \"       Please make an appointment with your health care provider(s) for follow up of your Emergency Department visit. VITALS:   Patient Vitals for the past 8 hrs:   Temp Pulse Resp BP SpO2   10/30/17 1756 97.9 °F (36.6 °C) 94 18 112/75 98 %          Thank you for allowing us to provide you with medical care today. We realize that you have many choices for your emergency care needs. Please choose us in the future for any continued health care needs. Regards,           Neil Montenegro [facility], [address] [place] [address].   Office: [phone]            Recent Results (from the past 24 hour(s))   URINALYSIS W/MICROSCOPIC    Collection Time: 10/30/17  6:11 PM Result Value Ref Range    Color YELLOW/STRAW      Appearance CLEAR CLEAR      Specific gravity 1.022 1.003 - 1.030      pH (UA) 5.5 5.0 - 8.0      Protein NEGATIVE  NEG mg/dL    Glucose NEGATIVE  NEG mg/dL    Ketone NEGATIVE  NEG mg/dL    Bilirubin NEGATIVE  NEG      Blood NEGATIVE  NEG      Urobilinogen 0.2 0.2 - 1.0 EU/dL    Nitrites NEGATIVE  NEG      Leukocyte Esterase NEGATIVE  NEG      WBC 0-4 0 - 4 /hpf    RBC 0-5 0 - 5 /hpf    Epithelial cells FEW FEW /lpf    Bacteria NEGATIVE  NEG /hpf    Hyaline cast 2-5 0 - 5 /lpf   URINE CULTURE HOLD SAMPLE    Collection Time: 10/30/17  6:11 PM   Result Value Ref Range    Urine culture hold URINE ON HOLD IN MICROBIOLOGY DEPT FOR 3 DAYS     HCG URINE, QL. - POC    Collection Time: 10/30/17  6:13 PM   Result Value Ref Range    Pregnancy test,urine (POC) NEGATIVE  NEG         Ct Abd Pelv Wo Cont    Result Date: 10/30/2017  EXAM:  CT ABD PELV WO CONT INDICATION: flank pain COMPARISON: None CONTRAST:  None. TECHNIQUE: Thin axial images were obtained through the abdomen and pelvis. Coronal and sagittal reconstructions were generated. Oral contrast was not administered. CT dose reduction was achieved through use of a standardized protocol tailored for this examination and automatic exposure control for dose modulation. The absence of intravenous contrast material reduces the sensitivity for evaluation of the solid parenchymal organs of the abdomen. FINDINGS: LUNG BASES: Clear. INCIDENTALLY IMAGED HEART AND MEDIASTINUM: Unremarkable. LIVER: No mass or biliary dilatation. GALLBLADDER: Unremarkable. SPLEEN: No mass. PANCREAS: No mass or ductal dilatation. ADRENALS: Unremarkable. KIDNEYS/URETERS: There is a punctate 1 mm nonobstructing stone midpole right kidney. No hydronephrosis or stones along the course of the ureters STOMACH: Unremarkable. SMALL BOWEL: No dilatation or wall thickening. COLON: No dilatation or wall thickening. APPENDIX: Unremarkable.  PERITONEUM: No ascites or pneumoperitoneum. RETROPERITONEUM: No lymphadenopathy or aortic aneurysm. REPRODUCTIVE ORGANS: Within normal limits for age URINARY BLADDER: No mass or calculus. BONES: No destructive bone lesion. ADDITIONAL COMMENTS: N/A     IMPRESSION: 1. Punctate nonobstructing right renal stone.  Otherwise no acute abnormality

## 2017-10-30 NOTE — ED PROVIDER NOTES
HPI Comments: 28 y.o. female with past medical history significant for anemia, GERD, anxiety and depression who presents ambulatory from home accompanied by her  with chief complaint of flank pain. Pt reports 2-month history of \"kidney issues\" causing pain. Pt c/o bilateral flank pain and CVA tenderness. Pt states per ultrasound, her right kidney is \"full of stones\" and her left kidney is \"half full of stones\". Pt has an appointment with urology on 11/3/17 that she \"won't be able to make\". Pt has been evaluated at [facility], [facility], her PCP's office and [facility], receiving prescriptions for oxycodone and percocet. Additionally, pt was diagnosed with a UTI 2 weeks ago at [facility]. Pt reports no change in pain with a course of abx. Pt denies taking regular medications. There are no other acute medical concerns at this time. Social hx: current every day tobacco smoker; denies EtOH use; denies illicit drug use  PCP: Brenton Adam MD    Note written by Twila Whittington, as dictated by Felipe Milan MD 6:12 PM         The history is provided by the patient. No  was used.         Past Medical History:   Diagnosis Date    Allergic to cats     Anemia     Anxiety and depression     managed by PCP    Disease of blood and blood forming organ     GERD (gastroesophageal reflux disease)     Postpartum depression     Screening for malignant neoplasm of the cervix 5/26/16    Negative (no hpv)    Stress     Susceptible to varicella (non-immune), currently pregnant 6/2016    vaccinate PP       Past Surgical History:   Procedure Laterality Date    HX WISDOM TEETH EXTRACTION  2012    Also gum graph         Family History:   Problem Relation Age of Onset    Heart Attack Mother     Lupus Mother     Heart Attack Father      x 2    Heart Disease Father     Hypertension Father        Social History     Social History    Marital status: SINGLE Spouse name: N/A    Number of children: N/A    Years of education: N/A     Occupational History    Not on file. Social History Main Topics    Smoking status: Current Every Day Smoker     Packs/day: 1.00     Years: 11.00     Types: Cigarettes    Smokeless tobacco: Never Used      Comment: Never used vapor or e-cigs     Alcohol use No    Drug use: No    Sexual activity: Yes     Partners: Male     Birth control/ protection: Pill     Other Topics Concern    Not on file     Social History Narrative         ALLERGIES: Review of patient's allergies indicates no known allergies. Review of Systems   Constitutional: Negative. Negative for appetite change, fever and unexpected weight change. HENT: Negative. Negative for ear pain, hearing loss, nosebleeds, rhinorrhea, sore throat and trouble swallowing. Respiratory: Negative. Negative for cough, chest tightness and shortness of breath. Cardiovascular: Negative. Negative for chest pain and palpitations. Gastrointestinal: Negative. Negative for abdominal distention, abdominal pain, blood in stool and vomiting. Endocrine: Negative. Genitourinary: Positive for flank pain. Negative for dysuria and hematuria. Musculoskeletal: Positive for back pain. Negative for myalgias. Skin: Negative. Negative for rash. Allergic/Immunologic: Negative. Neurological: Negative. Negative for dizziness, syncope, weakness and numbness. Hematological: Negative. Psychiatric/Behavioral: Negative. All other systems reviewed and are negative. Vitals:    10/30/17 1756   BP: 112/75   Pulse: 94   Resp: 18   Temp: 97.9 °F (36.6 °C)   SpO2: 98%   Weight: 47.6 kg (105 lb)   Height: 5' 1\" (1.549 m)            Physical Exam   Constitutional: She is oriented to person, place, and time. She appears well-developed and well-nourished. No distress. HENT:   Head: Normocephalic and atraumatic.    Right Ear: External ear normal.   Left Ear: External ear normal. Nose: Nose normal.   Mouth/Throat: Oropharynx is clear and moist.   Eyes: Conjunctivae and EOM are normal. Pupils are equal, round, and reactive to light. Neck: Normal range of motion. Neck supple. No JVD present. No thyromegaly present. Cardiovascular: Normal rate, regular rhythm, normal heart sounds and intact distal pulses. No murmur heard. Pulmonary/Chest: Effort normal and breath sounds normal. No respiratory distress. She has no wheezes. She has no rales. Abdominal: Soft. Bowel sounds are normal. She exhibits no distension. Bilateral flank tenderness with equivocal CVA tenderness. Musculoskeletal: Normal range of motion. She exhibits no edema. Neurological: She is alert and oriented to person, place, and time. No cranial nerve deficit. Skin: Skin is warm and dry. No rash noted. Psychiatric: She has a normal mood and affect. Her behavior is normal. Thought content normal.   Nursing note and vitals reviewed. Note written by Candance Salinas, Scribe, as dictated by Nena Holloway MD 6:11 PM     [facility]  ED Course       Procedures    Assessment:  UA clean without signs of infection. No blood. CT abd/pelv indicates punctate nonobstructing right renal stone. Otherwise no acute abnormality. Discussed available lab/imaging results with patient. Patient verbalizes understanding and agrees with care plan. Will discharge patient home with return precautions; NSAIDS for pain management; PCP follow-up.     Recent Results (from the past 24 hour(s))   URINALYSIS W/MICROSCOPIC    Collection Time: 10/30/17  6:11 PM   Result Value Ref Range    Color YELLOW/STRAW      Appearance CLEAR CLEAR      Specific gravity 1.022 1.003 - 1.030      pH (UA) 5.5 5.0 - 8.0      Protein NEGATIVE  NEG mg/dL    Glucose NEGATIVE  NEG mg/dL    Ketone NEGATIVE  NEG mg/dL    Bilirubin NEGATIVE  NEG      Blood NEGATIVE  NEG      Urobilinogen 0.2 0.2 - 1.0 EU/dL    Nitrites NEGATIVE  NEG      Leukocyte Esterase NEGATIVE  NEG      WBC 0-4 0 - 4 /hpf    RBC 0-5 0 - 5 /hpf    Epithelial cells FEW FEW /lpf    Bacteria NEGATIVE  NEG /hpf    Hyaline cast 2-5 0 - 5 /lpf   URINE CULTURE HOLD SAMPLE    Collection Time: 10/30/17  6:11 PM   Result Value Ref Range    Urine culture hold URINE ON HOLD IN MICROBIOLOGY DEPT FOR 3 DAYS     HCG URINE, QL. - POC    Collection Time: 10/30/17  6:13 PM   Result Value Ref Range    Pregnancy test,urine (POC) NEGATIVE  NEG         Ct Abd Pelv Wo Cont    Result Date: 10/30/2017  EXAM:  CT ABD PELV WO CONT INDICATION: flank pain COMPARISON: None CONTRAST:  None. TECHNIQUE: Thin axial images were obtained through the abdomen and pelvis. Coronal and sagittal reconstructions were generated. Oral contrast was not administered. CT dose reduction was achieved through use of a standardized protocol tailored for this examination and automatic exposure control for dose modulation. The absence of intravenous contrast material reduces the sensitivity for evaluation of the solid parenchymal organs of the abdomen. FINDINGS: LUNG BASES: Clear. INCIDENTALLY IMAGED HEART AND MEDIASTINUM: Unremarkable. LIVER: No mass or biliary dilatation. GALLBLADDER: Unremarkable. SPLEEN: No mass. PANCREAS: No mass or ductal dilatation. ADRENALS: Unremarkable. KIDNEYS/URETERS: There is a punctate 1 mm nonobstructing stone midpole right kidney. No hydronephrosis or stones along the course of the ureters STOMACH: Unremarkable. SMALL BOWEL: No dilatation or wall thickening. COLON: No dilatation or wall thickening. APPENDIX: Unremarkable. PERITONEUM: No ascites or pneumoperitoneum. RETROPERITONEUM: No lymphadenopathy or aortic aneurysm. REPRODUCTIVE ORGANS: Within normal limits for age URINARY BLADDER: No mass or calculus. BONES: No destructive bone lesion. ADDITIONAL COMMENTS: N/A     IMPRESSION: 1. Punctate nonobstructing right renal stone.  Otherwise no acute abnormality

## 2017-10-30 NOTE — ED TRIAGE NOTES
Pt c/o lower abd pain that radiates to lower back \"on both sides x 2 months. States was dx/d with stones but \"can't take pain any longer\". + urinary frequency + dysuria . Last void 2 hours pta.

## 2018-05-14 ENCOUNTER — OFFICE VISIT (OUTPATIENT)
Dept: OBGYN CLINIC | Age: 28
End: 2018-05-14

## 2018-05-14 VITALS
DIASTOLIC BLOOD PRESSURE: 71 MMHG | HEIGHT: 61 IN | WEIGHT: 118 LBS | BODY MASS INDEX: 22.28 KG/M2 | SYSTOLIC BLOOD PRESSURE: 109 MMHG | HEART RATE: 91 BPM

## 2018-05-14 DIAGNOSIS — Z01.419 ENCOUNTER FOR GYNECOLOGICAL EXAMINATION: Primary | ICD-10-CM

## 2018-05-14 DIAGNOSIS — B00.9 HSV-2 INFECTION: ICD-10-CM

## 2018-05-14 DIAGNOSIS — Z87.42 HISTORY OF DYSMENORRHEA: ICD-10-CM

## 2018-05-14 DIAGNOSIS — Z30.41 SURVEILLANCE FOR BIRTH CONTROL, ORAL CONTRACEPTIVES: ICD-10-CM

## 2018-05-14 RX ORDER — VALACYCLOVIR HYDROCHLORIDE 500 MG/1
500 TABLET, FILM COATED ORAL DAILY
Qty: 90 TAB | Refills: 4 | Status: SHIPPED | OUTPATIENT
Start: 2018-05-14 | End: 2019-07-08 | Stop reason: SDUPTHER

## 2018-05-14 RX ORDER — NORETHINDRONE ACETATE AND ETHINYL ESTRADIOL 1.5-30(21)
1 KIT ORAL DAILY
Qty: 3 PACKAGE | Refills: 4 | Status: SHIPPED | OUTPATIENT
Start: 2018-05-14 | End: 2019-02-22

## 2018-05-14 NOTE — PATIENT INSTRUCTIONS

## 2018-05-14 NOTE — PROGRESS NOTES
Annual exam ages 21-44    [address] is a ,  28 y.o. female [facility] Patient's last menstrual period was 04/15/2018 (exact date). , who presents for her annual checkup. Since her last annual GYN exam about one year ago on 17, she has had the following changes in her health history:   - Appendectomy on 17  - primary HSV-2 outbreak, had extensive involvement, including over buttocks      ADDITIONAL CONCERNS:  She is having no significant problems. With regard to the Gardasil vaccine, she is older than the FDA approved age to receive it. Menstrual status:    Her periods are moderate in flow. She is using one to two pads or tampons per day, usually regular and last 26-30 days. She denies dysmenorrhea. She denies premenstrual symptoms. Contraception:    The current method of family planning is OCP (estrogen/progesterone). Sexual history:     She  reports that she currently engages in sexual activity and has had male partners. She reports using the following method of birth control/protection: Pill. .        Pap and Mammogram History:    Her most recent Pap smear was normal, HPV was not indicated, obtained on 16. She does not have a history of abnormal paps.     The patient has never had a mammogram.    Breast Cancer History/Substance Abuse: Negative     Past Medical History:   Diagnosis Date    Allergic to cats     Anemia     Anxiety and depression     managed by PCP    Disease of blood and blood forming organ     GERD (gastroesophageal reflux disease)     Nexplanon removal 07/10/2017    Was inserted on 16    Postpartum depression     Screening for malignant neoplasm of the cervix 16    Negative (no hpv)    Stress     Susceptible to varicella (non-immune), currently pregnant 2016    vaccinate PP     Past Surgical History:   Procedure Laterality Date    HX APPENDECTOMY  2017    HX WISDOM TEETH EXTRACTION  2012    Also gum graph OB History    Para Term  AB Living   2 2 2   2   SAB TAB Ectopic Molar Multiple Live Births       0 2      # Outcome Date GA Lbr Adebayo/2nd Weight Sex Delivery Anes PTL Lv   2 Term 16 40w4d  6 lb 5.8 oz (2.885 kg) F Vag-Spont EPIDURAL AN N JENN   1 Term 14 39w3d 13:50 / 01:03 6 lb 7.7 oz (2.94 kg) M VAGINAL DELI EPIDURAL AN  JENN          Current Outpatient Prescriptions   Medication Sig Dispense Refill    valACYclovir (VALTREX) 1 gram tablet Take one tab daily for suppression. Increase to twice a day for 3 days for active outbreak. 90 Tab 3    norethindrone-ethinyl estradiol-iron (LOESTRIN FE 1.5/30, 28-DAY,) 1.5 mg-30 mcg (21)/75 mg (7) tab Take 1 Tab by mouth daily. 3 Package 3    escitalopram oxalate (LEXAPRO) 10 mg tablet Take 10 mg by mouth daily.  ALPRAZolam (XANAX) 0.25 mg tablet Take  by mouth.  IRON, FERROUS SULFATE, PO Take  by mouth.  buPROPion SR (WELLBUTRIN SR) 150 mg SR tablet Take  by mouth two (2) times a day.  prenatal multivit-ca-min-fe-fa tab Take  by mouth. Allergies: Review of patient's allergies indicates no known allergies. Social History     Social History    Marital status: SINGLE     Spouse name: N/A    Number of children: N/A    Years of education: N/A     Occupational History    Not on file. Social History Main Topics    Smoking status: Current Every Day Smoker     Packs/day: 1.00     Years: 11.00     Types: Cigarettes    Smokeless tobacco: Never Used      Comment: Never used vapor or e-cigs     Alcohol use No    Drug use: No    Sexual activity: Yes     Partners: Male     Birth control/ protection: Pill     Other Topics Concern    Not on file     Social History Narrative     Tobacco History:  reports that she has been smoking Cigarettes. She has a 11.00 pack-year smoking history. She has never used smokeless tobacco.  Alcohol Abuse:  reports that she does not drink alcohol.   Drug Abuse:  reports that she does not use illicit drugs.     Patient Active Problem List   Diagnosis Code    Leukocytosis D72.829     Family History   Problem Relation Age of Onset    Heart Attack Mother     Lupus Mother     Heart Attack Father      x 2    Heart Disease Father     Hypertension Father        Review of Systems - History obtained from the patient  Constitutional: negative for weight loss, fever, night sweats  HEENT: negative for hearing loss, earache, congestion, snoring, sorethroat  CV: negative for chest pain, palpitations, edema  Resp: negative for cough, shortness of breath, wheezing  GI: negative for change in bowel habits, abdominal pain, black or bloody stools  : negative for frequency, dysuria, hematuria, vaginal discharge  MSK: negative for back pain, joint pain, muscle pain  Breast: negative for breast lumps, nipple discharge, galactorrhea  Skin :negative for itching, rash, hives  Neuro: negative for dizziness, headache, confusion, weakness  Psych: negative for anxiety, depression, change in mood  Heme/lymph: negative for bleeding, bruising, pallor    Physical Exam    Visit Vitals    /71    Pulse 91    Ht 5' 1\" (1.549 m)    Wt 118 lb (53.5 kg)    LMP 04/15/2018 (Exact Date)    Breastfeeding No    BMI 22.3 kg/m2       Constitutional  · Appearance: well-nourished, well developed, alert, in no acute distress    HENT  · Head and Face: appears normal    Neck  · Inspection/Palpation: normal appearance, no masses or tenderness  · Lymph Nodes: no lymphadenopathy present  · Thyroid: gland size normal, nontender, no nodules or masses present on palpation    Chest  · Respiratory Effort: breathing unlabored  · Auscultation: normal breath sounds    Cardiovascular  · Heart:  · Auscultation: regular rate and rhythm without murmur    Breasts  · Inspection of Breasts: breasts symmetrical, no skin changes, no discharge present, nipple appearance normal, no skin retraction present  · Palpation of Breasts and Axillae: no masses present on palpation, no breast tenderness  · Axillary Lymph Nodes: no lymphadenopathy present    Gastrointestinal  · Abdominal Examination: abdomen non-tender to palpation, normal bowel sounds, no masses present  · Liver and spleen: no hepatomegaly present, spleen not palpable  · Hernias: no hernias identified    Genitourinary  · External Genitalia: normal appearance for age, no discharge present, no tenderness present, no inflammatory lesions present, no masses present, no atrophy present  · Vagina: normal vaginal vault without central or paravaginal defects, no discharge present, no inflammatory lesions present, no masses present  · Bladder: non-tender to palpation  · Urethra: appears normal  · Cervix: normal   · Uterus: normal size, shape and consistency  · Adnexa: no adnexal tenderness present, no adnexal masses present  · Perineum: perineum within normal limits, no evidence of trauma, no rashes or skin lesions present  · Anus: anus within normal limits, no hemorrhoids present  · Inguinal Lymph Nodes: no lymphadenopathy present    Skin  · General Inspection: no rash, no lesions identified    Neurologic/Psychiatric  · Mental Status:  · Orientation: grossly oriented to person, place and time  · Mood and Affect: mood normal, affect appropriate            Assessment & Plan:  · Routine gynecologic examination. Pap/HPV neg 5/2016 -> rpt 3yrs, will do next year. · HSV-2. Doing well on daily suppression Valtrex 1000mg daily. Has not had another outbreak since initial dx. Will decr to 500mg daily. Can call if has incr recurrence and wants to switch back to 1000mg daily. eRX #90 RFx4  · H/o dysmenorrhea, better on OCPs, wishes to continue. eRX LE 1.5/30 #3 RFx4  · Counseled re: diet, exercise, healthy lifestyle  · Return for yearly wellness visits  · Patient verbalized understanding      Orders Placed This Encounter    valACYclovir (VALTREX) 500 mg tablet     Sig: Take 1 Tab by mouth daily.      Dispense:  90 Tab Refill:  4    norethindrone-ethinyl estradiol-iron (LOESTRIN FE 1.5/30, 28-DAY,) 1.5 mg-30 mcg (21)/75 mg (7) tab     Sig: Take 1 Tab by mouth daily.      Dispense:  3 Package     Refill:  4

## 2018-07-19 ENCOUNTER — TELEPHONE (OUTPATIENT)
Dept: OBGYN CLINIC | Age: 28
End: 2018-07-19

## 2018-07-19 NOTE — TELEPHONE ENCOUNTER
Returned pt call. Has HSV. Asking about transmission. Wondering if could be transmitted to her kids via contact with clothing (\"could it sweat off of my clothes onto theirs\"). Reassured pt that transmission is by direct skin-to-skin contact. Should not be transmitted via clothing.

## 2018-07-19 NOTE — TELEPHONE ENCOUNTER
Message left at [id number]    33year old patient last seen in the office on 5/14/18. Patient left a message asking to receive a call from Dr. Philip Colón. This nurse called the patient and advised that MD at lunch and then has a full schedule of patients. This nurse offered to send a message on patiens behalf and patient declined stating she would like a call back from MD.       Patient can be reached at [phone]

## 2019-02-21 NOTE — PROGRESS NOTES
Current pregnancy history: 
 
Kaylin Hogan is a ,  33 y.o. female [facility] Patient's last menstrual period was 2018. Romaine Montana She presents for the evaluation of amenorrhea and a positive pregnancy test. 
 
LMP history: 
The date of her LMP is certain. Her last menstrual period was normal and lasted for 4 to 5 days. A urine pregnancy test was positive a few weeks ago. She was not on the pill at conception. Based on her LMP, her EDC is 19 and her EGA is 14 weeks,3 days. Her menstrual cycles are regular and occur approximately every 28 days  and range from 3 to 5 days. The last menses lasted 4-5 the usual number of days. Ultrasound data: 
She had an  ultrasound done by the ultrasound tech today which revealed a viable schrader pregnancy with a gestational age of 12 weeks and 6 days giving an EDC of 19. TA ULTRASOUND A SINGLE 14W6D IUP IS SEEN BY TODAY'S ULTRASOUND. FETAL CARDIAC MOTION OBSERVED. LIMITED ANATOMY WAS VISUALIZED AND APPEARS WNL. APPROPRIATE FETAL GROWTH IS SEEN. SIZE = DATES. SILAS AND PLACENTA APPEAR WNL. Pregnancy symptoms: 
 
Since her LMP she has experienced  urinary frequency, breast tenderness, and nausea. She has not been vomiting over the last few weeks. Associated signs and symptoms which she denies: dysuria, discharge, vaginal bleeding. She states she has gained weight:  Approximately 5 pounds over the last few weeks. Relevant past pregnancy history: She has the following pregnancy history: Her last pregnancy was uncomplicated. Did have manual removal of placenta. 
- h/o leukosytosis with prev pregnancy - saw hematology (Dr. Hernandez ) Relevant past medical history:(relevant to this pregnancy):  
- anxiety, depression, h/o PP depression Pap/Occupational history: 
Last pap smear: 16 Results: Normal   
 
Her occupation is: Homemaker. Substance history: negative for alcohol,  and street drug 
+tobacco (1PPD) Exposure history: There are indoor cats in the home. The patient was instructed to not change the cat litter. She denies close contact with children on a regular basis. She has had chicken pox or the vaccine in the past.  
Patient denies issues with domestic violence. Genetic Screening/Teratology Counseling: (Includes patient, baby's father, or anyone in either family with:) 1.  Patient's age >/= 28 at [facility]?-- no  . 2. Thalassemia ([facility], [facility], [address] [facility], or  background): MCV<80?--no.    
3.  Neural tube defect (meningomyelocele, spina bifida, anencephaly)?--no.  
4.  Congenital heart defect?--no. 
5.  Down syndrome?--no.  
6.  Kamran-Sachs ([place], [place])?--no.  
7.  Canavan's Disease?--no.  
8.  Familial Dysautonomia?--no.  
9.  Sickle cell disease or trait ()? --no The patient has not been tested for sickle trait 10. Hemophilia or other blood disorders?--no. 11.  Muscular dystrophy?--no. 12.  Cystic fibrosis?--no. 13.  Thomas's Chorea?--no. 14.  Mental retardation/autism (if yes was person tested for Fragile X)?--no. 15.  Other inherited genetic or chromosomal disorder?--no. 12.  Maternal metabolic disorder (DM, PKU, etc)?--no. 17.  Patient or FOB with a child with a birth defect not listed above?--no. 
17a. Patient or FOB with a birth defect themselves?--no. 18.  Recurrent pregnancy loss, or stillbirth?--no. 19.  Any medications since LMP other than prenatal vitamins (include vitamins, supplements, OTC meds, drugs, alcohol)? -- Lexapro 20. Any other genetic/environmental exposure to discuss?--no. Infection History: 1. Lives with someone with TB or TB exposed?--no.  
2.  Patient or partner has history of genital herpes?--no. 
3.  Rash or viral illness since LMP?--no.   
4.  History of STD (GC, CT, HPV, syphilis, HIV)? --no  
5. Other: OTHER? Past Medical History:  
Diagnosis Date  Allergic to cats  Anemia  Anxiety and depression managed by PCP  Disease of blood and blood forming organ  GERD (gastroesophageal reflux disease)  Nexplanon removal 07/10/2017 Was inserted on 16  Postpartum depression  Screening for malignant neoplasm of the cervix 16 Negative (no hpv)  Stress  Susceptible to varicella (non-immune), currently pregnant 2016  
 vaccinate PP Past Surgical History:  
Procedure Laterality Date  HX APPENDECTOMY  2017 [address] EXTRACTION  2012 Also gum graph Social History Occupational History  Not on file Tobacco Use  Smoking status: Current Every Day Smoker Packs/day: 1.00 Years: 11.00 Pack years: 11.00 Types: Cigarettes  Smokeless tobacco: Never Used  Tobacco comment: Never used vapor or e-cigs Substance and Sexual Activity  Alcohol use: No  
  Alcohol/week: 0.0 oz  Drug use: No  
 Sexual activity: Yes  
  Partners: Male Family History Problem Relation Age of Onset  Heart Attack Mother  Lupus Mother  Heart Attack Father   
     x 2  
 Heart Disease Father  Hypertension Father OB History  Para Term  AB Living 3 2 2     2 SAB TAB Ectopic Molar Multiple Live Births  
        0 2 # Outcome Date GA Lbr Adebayo/2nd Weight Sex Delivery Anes PTL Lv  
3 Current 2 Term 16 40w4d  6 lb 5.8 oz (2.885 kg) F Vag-Spont EPIDURAL AN N JENN  
1 Term 14 39w3d 13:50 / 01:03 6 lb 7.7 oz (2.94 kg) M VAGINAL DELI EPIDURAL AN  JENN No Known Allergies Prior to Admission medications Medication Sig Start Date End Date Taking? Authorizing Provider  
valACYclovir (VALTREX) 500 mg tablet Take 1 Tab by mouth daily. 18  Yes Gena Garcia MD  
escitalopram oxalate (LEXAPRO) 10 mg tablet Take 10 mg by mouth daily. Yes Provider, Historical  
IRON, FERROUS SULFATE, PO Take  by mouth.    Yes Provider, Historical  
 norethindrone-ethinyl estradiol-iron (LOESTRIN FE 1.5/30, 28-DAY,) 1.5 mg-30 mcg (21)/75 mg (7) tab Take 1 Tab by mouth daily. 5/14/18   Foreign Diaz MD  
  
 
Review of Systems: History obtained from the patient Constitutional: negative for weight loss, fever, night sweats HEENT: negative for hearing loss, earache, congestion, snoring, sore throat CV: negative for chest pain, palpitations, edema Resp: negative for cough, shortness of breath, wheezing Breast: negative for breast lumps, nipple discharge, galactorrhea GI: negative for change in bowel habits, abdominal pain, black or bloody stools : negative for frequency, dysuria, hematuria, vaginal discharge MSK: negative for back pain, joint pain, muscle pain Skin: negative for itching, rash, hives Neuro: negative for dizziness, headache, confusion, weakness Psych: negative for anxiety, depression, change in mood Heme/lymph: negative for bleeding, bruising, pallor Objective: 
Visit Vitals /74 Pulse 90 Ht 5' 1\" (1.549 m) Wt 129 lb (58.5 kg) LMP 11/13/2018 Breastfeeding? No  
BMI 24.37 kg/m² Physical Exam:  
 
Constitutional 
· Appearance: well-nourished, well developed, alert, in no acute distress HENT 
· Head · Face: appears normal 
· Eyes: appear normal 
· Ears: normal 
· Mouth: normal 
· Lips: no lesions Neck · Inspection/Palpation: normal appearance, no masses or tenderness · Lymph Nodes: no lymphadenopathy present · Thyroid: gland size normal, nontender, no nodules or masses present on palpation Chest 
· Respiratory Effort: breathing unlabored · Auscultation: normal breath sounds Cardiovascular · Heart: 
· Auscultation: regular rate and rhythm without murmur Breasts · Inspection of Breasts: breasts symmetrical, no skin changes, no discharge present, nipple appearance normal, no skin retraction present · Palpation of Breasts and Axillae: no masses present on palpation, no breast tenderness · Axillary Lymph Nodes: no lymphadenopathy present Gastrointestinal 
· Abdominal Examination: abdomen non-tender to palpation, normal bowel sounds, no masses present · Liver and spleen: no hepatomegaly present, spleen not palpable · Hernias: no hernias identified Genitourinary · External Genitalia: normal appearance for age, no discharge present, no tenderness present, no inflammatory lesions present, no masses present, no atrophy present · Vagina: normal vaginal vault without central or paravaginal defects, no discharge present, no inflammatory lesions present, no masses present · Bladder: non-tender to palpation · Urethra: appears normal 
· Cervix: normal  
· Uterus: enlarged, normal shape, soft · Adnexa: no adnexal tenderness present, no adnexal masses present · Perineum: perineum within normal limits, no evidence of trauma, no rashes or skin lesions present · Anus: anus within normal limits, no hemorrhoids present · Inguinal Lymph Nodes: no lymphadenopathy present Skin · General Inspection: no rash, no lesions identified Neurologic/Psychiatric · Mental Status: · Orientation: grossly oriented to person, place and time · Mood and Affect: mood normal, affect appropriate Assessment:  
Intrauterine pregnancy: 
- U=D. Will use LMP for datin18 -> CHANDA=19 
- +tobacco -> enc to quit. Nicotine patches OK in pregnancy. - On Lexapro -> MFM US with MFM 
- h/o iron def -> ferritin, Fe/TIBC with NOB labs - h/o Varicella non-immune -> draw IgG titer with labs today 
- wants Panorama and Horizon (CF-97 neg with prev preg) -> test kit given 
- anxiety, depression, h/o PP depression 
- +HSV -> Valtrex @ 34-36wks. - h/o leukosytosis with previous pregnancy Plan:  
 
· Offered CF testing, CVS, Nuchal Translucency, MSAFP, amnio, and discussed NIPT · Course of pregnancy discussed including visit schedule, routine U/S, glucola testing, etc. 
 · Avoid alcoholic beverages and illicit/recreational drugs use · Take prenatal vitamins or folic acid daily. · Hospital and practice style discussed with coverage system. · Discussed nutrition, toxoplasmosis precautions, sexual activity, exercise, need for influenza vaccine, environmental and work hazards, travel advice, screen for domestic violence, need for seat belts. · Discussed seafood, unpasteurized dairy products, deli meat, artificial sweeteners, and caffeine. · Information on prenatal classes/breastfeeding given. · Patient encouraged not to smoke. · Discussed current prescription drug use. Given medication list. 
· Discussed the use of over the counter medications and chemicals. · Pt understands risk of hemorrhage during pregnancy and post delivery and would accept blood products if necessary in life-threatening emergencies Handouts given to pt. Orders Placed This Encounter  CULTURE, URINE  VZV AB, IGG  
 MISC. LAB TEST Order Specific Question:   Test description: Answer:   Panorama/Horizon  HEP B SURFACE AG  
 HIV SCREEN, 4TH GEN. W/REFLEX CONFIRM  
 CBC W/O DIFF  
 RUBELLA AB, IGG  T PALLIDUM SCREEN W/REFLEX  CT/NG/T.VAGINALIS AMPLIFICATION Order Specific Question:   Specimen type Answer:   Vaginal [516]  URINALYSIS W/MICROSCOPIC  FERRITIN  
 IRON PROFILE  
 REFERRAL TO MATERNAL FETAL MEDICINE Referral Priority:   Routine Referral Type:   Consultation Referral Reason:   Specialty Services Required Referred to Provider:   Junie Mandujano MD  
  Number of Visits Requested:   1  
 TYPE, ABO & RH  
 ANTIBODY SCREEN  
 PAP, IG, RFX HPV ASCUS ([id number])

## 2019-02-22 ENCOUNTER — OFFICE VISIT (OUTPATIENT)
Dept: OBGYN CLINIC | Age: 29
End: 2019-02-22

## 2019-02-22 VITALS
SYSTOLIC BLOOD PRESSURE: 123 MMHG | BODY MASS INDEX: 24.35 KG/M2 | HEART RATE: 90 BPM | WEIGHT: 129 LBS | DIASTOLIC BLOOD PRESSURE: 74 MMHG | HEIGHT: 61 IN

## 2019-02-22 DIAGNOSIS — O09.899 MATERNAL VARICELLA, NON-IMMUNE: ICD-10-CM

## 2019-02-22 DIAGNOSIS — Z32.01 POSITIVE PREGNANCY TEST: Primary | ICD-10-CM

## 2019-02-22 DIAGNOSIS — D64.9 ANEMIA, UNSPECIFIED TYPE: ICD-10-CM

## 2019-02-22 DIAGNOSIS — Z28.39 MATERNAL VARICELLA, NON-IMMUNE: ICD-10-CM

## 2019-02-22 DIAGNOSIS — Z13.79 GENETIC TESTING: ICD-10-CM

## 2019-02-22 PROBLEM — Z34.90 PREGNANCY: Status: ACTIVE | Noted: 2019-02-22

## 2019-02-22 LAB
ANTIBODY SCREEN, EXTERNAL: NEGATIVE
ANTIBODY SCREEN, EXTERNAL: NEGATIVE
CHLAMYDIA, EXTERNAL: NEGATIVE
HBSAG, EXTERNAL: NEGATIVE
HCT, EXTERNAL: 36.8
HGB, EXTERNAL: 12.3
HIV, EXTERNAL: NEGATIVE
N. GONORRHEA, EXTERNAL: NEGATIVE
PLATELET CNT,   EXTERNAL: 218
RUBELLA, EXTERNAL: NEGATIVE
T. PALLIDUM, EXTERNAL: NEGATIVE
TYPE, ABO & RH, EXTERNAL: NORMAL
URINALYSIS, EXTERNAL: NEGATIVE
VARICELLA, EXTERNAL: NORMAL

## 2019-02-22 NOTE — PATIENT INSTRUCTIONS

## 2019-02-23 LAB
APPEARANCE UR: CLEAR
BACTERIA #/AREA URNS HPF: ABNORMAL /[HPF]
BILIRUB UR QL STRIP: NEGATIVE
CASTS URNS QL MICRO: ABNORMAL /LPF
COLOR UR: YELLOW
EPI CELLS #/AREA URNS HPF: >10 /HPF
GLUCOSE UR QL: NEGATIVE
HGB UR QL STRIP: NEGATIVE
KETONES UR QL STRIP: NEGATIVE
LEUKOCYTE ESTERASE UR QL STRIP: ABNORMAL
MICRO URNS: ABNORMAL
MUCOUS THREADS URNS QL MICRO: PRESENT
NITRITE UR QL STRIP: NEGATIVE
PH UR STRIP: 6 [PH] (ref 5–7.5)
PROT UR QL STRIP: NEGATIVE
RBC #/AREA URNS HPF: ABNORMAL /HPF
SP GR UR: 1.01 (ref 1–1.03)
UROBILINOGEN UR STRIP-MCNC: 0.2 MG/DL (ref 0.2–1)
WBC #/AREA URNS HPF: ABNORMAL /HPF

## 2019-02-24 LAB
ABO GROUP BLD: NORMAL
BLD GP AB SCN SERPL QL: NEGATIVE
C TRACH RRNA SPEC QL NAA+PROBE: NEGATIVE
ERYTHROCYTE [DISTWIDTH] IN BLOOD BY AUTOMATED COUNT: 13.4 % (ref 12.3–15.4)
FERRITIN SERPL-MCNC: 40 NG/ML (ref 15–150)
HBV SURFACE AG SERPL QL IA: NEGATIVE
HCT VFR BLD AUTO: 36.8 % (ref 34–46.6)
HGB BLD-MCNC: 12.3 G/DL (ref 11.1–15.9)
HIV 1+2 AB+HIV1 P24 AG SERPL QL IA: NON REACTIVE
IRON SATN MFR SERPL: 31 % (ref 15–55)
IRON SERPL-MCNC: 113 UG/DL (ref 27–159)
MCH RBC QN AUTO: 31.7 PG (ref 26.6–33)
MCHC RBC AUTO-ENTMCNC: 33.4 G/DL (ref 31.5–35.7)
MCV RBC AUTO: 95 FL (ref 79–97)
N GONORRHOEA RRNA SPEC QL NAA+PROBE: NEGATIVE
PLATELET # BLD AUTO: 218 X10E3/UL (ref 150–379)
RBC # BLD AUTO: 3.88 X10E6/UL (ref 3.77–5.28)
RH BLD: POSITIVE
RUBV IGG SERPL IA-ACNC: 1.42 INDEX
T PALLIDUM AB SER QL IA: NEGATIVE
T VAGINALIS RRNA VAG QL NAA+PROBE: NEGATIVE
TIBC SERPL-MCNC: 359 UG/DL (ref 250–450)
UIBC SERPL-MCNC: 246 UG/DL (ref 131–425)
VZV IGG SER IA-ACNC: <135 INDEX
WBC # BLD AUTO: 14.7 X10E3/UL (ref 3.4–10.8)

## 2019-02-25 LAB — BACTERIA UR CULT: NORMAL

## 2019-02-26 PROBLEM — R87.619 ABNORMAL PAP SMEAR OF CERVIX: Status: ACTIVE | Noted: 2019-02-26

## 2019-02-26 LAB
CYTOLOGIST CVX/VAG CYTO: ABNORMAL
CYTOLOGY CVX/VAG DOC THIN PREP: ABNORMAL
CYTOLOGY HISTORY:: ABNORMAL
DX ICD CODE: ABNORMAL
DX ICD CODE: ABNORMAL
LABCORP, 190119: ABNORMAL
Lab: ABNORMAL
OTHER STN SPEC: ABNORMAL
PATH REPORT.FINAL DX SPEC: ABNORMAL
PATHOLOGIST CVX/VAG CYTO: ABNORMAL
STAT OF ADQ CVX/VAG CYTO-IMP: ABNORMAL

## 2019-03-01 NOTE — PROGRESS NOTES
Added to PNL and OB Problem list 
Patient notified and verbalized understanding Colpo nohelia'd for 3/12/19 at 10:30am

## 2019-04-02 ENCOUNTER — HOSPITAL ENCOUNTER (OUTPATIENT)
Dept: PERINATAL CARE | Age: 29
Discharge: HOME | End: 2019-04-02
Attending: OBSTETRICS & GYNECOLOGY
Payer: MEDICAID

## 2019-04-02 PROCEDURE — 76811 OB US DETAILED SNGL FETUS: CPT | Performed by: OBSTETRICS & GYNECOLOGY

## 2019-06-25 ENCOUNTER — TELEPHONE (OUTPATIENT)
Dept: OBGYN CLINIC | Age: 29
End: 2019-06-25

## 2019-06-25 NOTE — TELEPHONE ENCOUNTER
----- Message from April JOHN Lewis sent at 2/7/9898  5:13 PM EST -----  Regarding: katelyn BANUELOS LGSIL pap colpo at next visit

## 2019-06-25 NOTE — TELEPHONE ENCOUNTER
Made another attempt to reach pt, but both numbers in file are invalid. Letters mailed in May and June.

## 2019-07-01 RX ORDER — VALACYCLOVIR HYDROCHLORIDE 500 MG/1
TABLET, FILM COATED ORAL
Qty: 30 TAB | OUTPATIENT
Start: 2019-07-01

## 2019-07-01 NOTE — TELEPHONE ENCOUNTER
DM patient       33year old  28 w6d pregnant patient last seen in the office on 19    Patient has no showed last three appointments    ?  Refill requested prescription    Please advise

## 2019-07-08 ENCOUNTER — ROUTINE PRENATAL (OUTPATIENT)
Dept: OBGYN CLINIC | Age: 29
End: 2019-07-08

## 2019-07-08 VITALS
WEIGHT: 153 LBS | BODY MASS INDEX: 28.89 KG/M2 | SYSTOLIC BLOOD PRESSURE: 121 MMHG | DIASTOLIC BLOOD PRESSURE: 74 MMHG | HEART RATE: 115 BPM | HEIGHT: 61 IN

## 2019-07-08 DIAGNOSIS — D64.9 ANEMIA, UNSPECIFIED TYPE: ICD-10-CM

## 2019-07-08 DIAGNOSIS — Z3A.33 33 WEEKS GESTATION OF PREGNANCY: ICD-10-CM

## 2019-07-08 DIAGNOSIS — Z34.82 SUPERVISION OF NORMAL INTRAUTERINE PREGNANCY IN MULTIGRAVIDA, SECOND TRIMESTER: Primary | ICD-10-CM

## 2019-07-08 DIAGNOSIS — Z23 ENCOUNTER FOR IMMUNIZATION: ICD-10-CM

## 2019-07-08 DIAGNOSIS — O09.33 LIMITED PRENATAL CARE IN THIRD TRIMESTER: ICD-10-CM

## 2019-07-08 RX ORDER — VALACYCLOVIR HYDROCHLORIDE 500 MG/1
500 TABLET, FILM COATED ORAL DAILY
Qty: 90 TAB | Refills: 4 | Status: SHIPPED | OUTPATIENT
Start: 2019-07-08 | End: 2019-07-08 | Stop reason: DRUGHIGH

## 2019-07-08 RX ORDER — VALACYCLOVIR HYDROCHLORIDE 500 MG/1
500 TABLET, FILM COATED ORAL 2 TIMES DAILY
Qty: 60 TAB | Refills: 1 | Status: SHIPPED | OUTPATIENT
Start: 2019-07-08 | End: 2019-08-22

## 2019-07-08 NOTE — PROGRESS NOTES
+FM. Scant prenatal care. This is her first visit since her EOB visit @ 14wks back in February. Pt's mother dx'd with SLE, broke her back, had \"blockage in her heart\". This is why she has not come in for her prenatal visits. Confirms she has received our phone calls, emails and letters. C/o hand numbness and tingle sensation, feet swelling and RLQ pain. Not taking PNV or iron as prev advised. Known +HSV, reports ~5 outbreaks during this preg, aware C/S if sx at time of labor. +tob, has decreased to 1cig/d.  1hr/CBC/iron today. TDAP today. Schedule anatomy scan (advised may be limited d/t advanced gest age). eRX Valtrex 500mg BID. Schedule US. Enc PNV/iron. Enc to quit smoking. nohelia colpo RTO 1-2wks.

## 2019-07-08 NOTE — PROGRESS NOTES
Administered 0.5mL TETANUS, DIPHTHERIA TOXOIDS AND ACELLULAR PERTUSSIS VACCINE (TDAP) per MD order. Patient consent signed by patient. Injection given IM in the left deltoid by Americo Hamman, LPN . Patient tolerated well, no complications, no side effects.     Lot # [id number]  Exp: 9/4/21

## 2019-07-08 NOTE — PATIENT INSTRUCTIONS
Weeks 32 to 34 of Your Pregnancy: Care Instructions  Your Care Instructions    During the last few weeks of your pregnancy, you may have more aches and pains. It's important to rest when you can. Your growing baby is putting more pressure on your bladder. So you may need to urinate more often. Hemorrhoids are also common. These are painful, itchy veins in the rectal area. In the 36th week, most women have a test for group B streptococcus (GBS). GBS is a common bacteria that can live in the vagina and rectum. It can make your baby sick after birth. If you test positive, you will get antibiotics during labor. These will keep your baby from getting the bacteria. You may want to talk with your doctor about banking your baby's umbilical cord blood. This is the blood left in the cord after birth. If you want to save this blood, you must arrange it ahead of time. You can't decide at the last minute. If you haven't already had the Tdap shot during this pregnancy, talk to your doctor about getting it. It will help protect your  against pertussis infection. Follow-up care is a key part of your treatment and safety. Be sure to make and go to all appointments, and call your doctor if you are having problems. It's also a good idea to know your test results and keep a list of the medicines you take. How can you care for yourself at home? Ease hemorrhoids  · Get more liquids, fruits, vegetables, and fiber in your diet. This will help keep your stools soft. · Avoid sitting for too long. Lie on your left side several times a day. · Clean yourself with soft, moist toilet paper. Or you can use witch hazel pads or personal hygiene pads. · If you are uncomfortable, try ice packs. Or you can sit in a warm sitz bath. Do these for 20 minutes at a time, as needed. · Use hydrocortisone cream for pain and itching. Two examples are Anusol and Preparation H Hydrocortisone.   · Ask your doctor about taking an over-the-counter stool softener. Consider breastfeeding  · Experts recommend that women breastfeed for 1 year or longer. Breast milk is the perfect food for babies. · Breast milk is easier for babies to digest than formula. And it is always available, just the right temperature, and free. · Breast milk may help protect your child from some health problems.  babies are less likely than formula-fed babies to:  ? Get ear infections, colds, diarrhea, and pneumonia. ? Be obese or get diabetes later in life. · Women who breastfeed have less bleeding after the birth. Their uteruses also shrink back faster. · Some women who breastfeed lose weight faster. Making milk burns calories. · Breastfeeding can lower your risk of breast cancer, ovarian cancer, and osteoporosis. Decide about circumcision for boys  · As you make this decision, it may help to think about your personal, Taoist, and family traditions. You get to decide if you will keep your son's penis natural or if he will be circumcised. · If you decide that you would like to have your baby circumcised, talk with your doctor. You can share your concerns about pain. And you can discuss your preferences for anesthesia. Where can you learn more? Go to http://nolan-ibrahima.info/. Enter H652 in the search box to learn more about \"Weeks 32 to 34 of Your Pregnancy: Care Instructions. \"  Current as of: September 5, 2018  Content Version: 11.9  © 3143-6791 [employer]. Care instructions adapted under license by [employer] (which disclaims liability or warranty for this information). If you have questions about a medical condition or this instruction, always ask your healthcare professional. [place] [address] any warranty or liability for your use of this information.

## 2019-07-09 LAB
ERYTHROCYTE [DISTWIDTH] IN BLOOD BY AUTOMATED COUNT: 14.5 % (ref 12.3–15.4)
FERRITIN SERPL-MCNC: 7 NG/ML (ref 15–150)
GLUCOSE 1H P 50 G GLC PO SERPL-MCNC: 116 MG/DL (ref 65–139)
HCT VFR BLD AUTO: 28.7 % (ref 34–46.6)
HGB BLD-MCNC: 9.1 G/DL (ref 11.1–15.9)
IRON SATN MFR SERPL: 3 % (ref 15–55)
IRON SERPL-MCNC: 15 UG/DL (ref 27–159)
MCH RBC QN AUTO: 28.3 PG (ref 26.6–33)
MCHC RBC AUTO-ENTMCNC: 31.7 G/DL (ref 31.5–35.7)
MCV RBC AUTO: 89 FL (ref 79–97)
PLATELET # BLD AUTO: 282 X10E3/UL (ref 150–450)
RBC # BLD AUTO: 3.21 X10E6/UL (ref 3.77–5.28)
TIBC SERPL-MCNC: 596 UG/DL (ref 250–450)
UIBC SERPL-MCNC: 581 UG/DL (ref 131–425)
WBC # BLD AUTO: 21.2 X10E3/UL (ref 3.4–10.8)

## 2019-07-23 ENCOUNTER — ROUTINE PRENATAL (OUTPATIENT)
Dept: OBGYN CLINIC | Age: 29
End: 2019-07-23

## 2019-07-23 VITALS
HEIGHT: 62 IN | SYSTOLIC BLOOD PRESSURE: 121 MMHG | HEART RATE: 112 BPM | BODY MASS INDEX: 29.08 KG/M2 | WEIGHT: 158 LBS | DIASTOLIC BLOOD PRESSURE: 74 MMHG

## 2019-07-23 DIAGNOSIS — Z3A.36 36 WEEKS GESTATION OF PREGNANCY: ICD-10-CM

## 2019-07-23 DIAGNOSIS — R87.612 LGSIL ON PAP SMEAR OF CERVIX: ICD-10-CM

## 2019-07-23 DIAGNOSIS — O09.33 LIMITED PRENATAL CARE IN THIRD TRIMESTER: Primary | ICD-10-CM

## 2019-07-23 LAB — GRBS, EXTERNAL: NEGATIVE

## 2019-07-23 NOTE — PROGRESS NOTES
[facility]  OFFICE PROCEDURE PROGRESS NOTE        Chart reviewed for the following:   Jarrett Blake, have reviewed the History, Physical and updated the Allergic reactions for Slipager 71 performed immediately prior to start of procedure:   Tamy MCCORD, have performed the following reviews on [address] [place] [address] prior to the start of the procedure:            * Patient was identified by name and date of birth   * Agreement on procedure being performed was verified  * Risks and Benefits explained to the patient  * Procedure site verified and marked as necessary  * Patient was positioned for comfort  * Consent was signed and verified     Time: 895      Date of procedure: 2019    Procedure performed by:  Alfredo Madrigal MD    Provider assisted by: VINH Rosas    Patient assisted by: self    How tolerated by patient: tolerated the procedure well with no complications. Had some mild dizziness and nausea d/t lying flat @ 36wks    Post Procedural Pain Scale: 0 - No Hurt    Comments: none          Procedure Note: Colposcopy    [address] [place] [address] is a ,  26 y.o. female WHITE OR  Patient's last menstrual period was 2018. She presents for colposcopy for evaluation of a cervical abnormality with a pap smear abnormality consisting of LSIL. After being presented with the risks, benefits and alternatives has she signed a consent for the procedure. She states that she understands the need for the procedure and has no further questions. She was informed that she may experience discomfort. Procedure:  She was positioned in the dorsal lithotomy position and a speculum was inserted into the vagina. Dilute acetic acid was applied to the cervix. The colposcope was used to visualize the cervix. Procedure: The transformation zone was completely visualized. Findings: This colposcopy was satisfactory.  The procedure was notable for white epithelium on the cervix. Biopsies were NOT taken from the cervix . See accompanying diagram.  Endocervical currettage: An endocervical curettage was NOT performed. Post Procedure Status: The patient tolerated the procedure well with minimal discomfort. She was observed for 15 minutes and released in good condition.

## 2019-07-23 NOTE — PATIENT INSTRUCTIONS
Colposcopy: What to Expect at [address] [place] [address] may feel some soreness in your vagina for a day or two if you had a biopsy. Some vaginal bleeding or discharge is normal for up to a week after a biopsy. The discharge may be dark-colored if a solution was put on your cervix. You can use a sanitary pad for the bleeding. It may take a week or two for you to get the test results. This care sheet gives you a general idea about how long it will take for you to recover. But each person recovers at a different pace. Follow the steps below to feel better as quickly as possible. How can you care for yourself at home? Activity    · You can return to work and most daily activities right after the test.   Exercise    · Do not exercise for 1 day after the test.   Medicines    · Your doctor will tell you if and when you can restart your medicines. He or she will also give you instructions about taking any new medicines.     · If you take blood thinners, such as warfarin (Coumadin), clopidogrel (Plavix), or aspirin, be sure to talk to your doctor. He or she will tell you if and when to start taking those medicines again. Make sure that you understand exactly what your doctor wants you to do.     · Take an over-the-counter pain medicine, such as acetaminophen (Tylenol), ibuprofen (Advil, Motrin), or naproxen (Aleve). Be safe with medicines. Read and follow all instructions on the label. Do not take two or more pain medicines at the same time unless the doctor told you to. Many pain medicines have acetaminophen, which is Tylenol. Too much acetaminophen (Tylenol) can be harmful. Other instructions    · Use a pad if you have some bleeding.     · Do not douche, have sexual intercourse, or use tampons for 1 week if you had a biopsy. This will allow time for your cervix to heal.     · You can take a bath or shower anytime after the test.   Follow-up care is a key part of your treatment and safety.  Be sure to make and go to all appointments, and call your doctor if you are having problems. It's also a good idea to know your test results and keep a list of the medicines you take. When should you call for help? Call your doctor now or seek immediate medical care if:    · You have severe vaginal bleeding. This means that you are soaking through your usual pads or tampons each hour for 2 or more hours.     · You have pain that does not get better after you take pain medicine.     · You have signs of infection, such as:  ? Increased pain. ? Bad-smelling vaginal discharge. ? A fever.    Watch closely for any changes in your health, and be sure to contact your doctor if:    · You have questions or concerns. Where can you learn more? Go to http://nolan-ibrahima.info/. Enter M523 in the search box to learn more about \"Colposcopy: What to Expect at Home. \"  Current as of: December 19, 2018  Content Version: 12.1  © 2871-8829 [employer]. Care instructions adapted under license by [employer] (which disclaims liability or warranty for this information). If you have questions about a medical condition or this instruction, always ask your healthcare professional. [address] any warranty or liability for your use of this information.

## 2019-07-25 LAB — GP B STREP DNA SPEC QL NAA+PROBE: NEGATIVE

## 2019-07-29 ENCOUNTER — HOSPITAL ENCOUNTER (EMERGENCY)
Age: 29
Discharge: HOME | End: 2019-07-29
Attending: EMERGENCY MEDICINE | Admitting: OBSTETRICS & GYNECOLOGY
Payer: MEDICAID

## 2019-07-29 VITALS
DIASTOLIC BLOOD PRESSURE: 84 MMHG | RESPIRATION RATE: 16 BRPM | BODY MASS INDEX: 29.08 KG/M2 | TEMPERATURE: 98.6 F | WEIGHT: 158 LBS | HEART RATE: 109 BPM | SYSTOLIC BLOOD PRESSURE: 135 MMHG | HEIGHT: 62 IN | OXYGEN SATURATION: 99 %

## 2019-07-29 DIAGNOSIS — O36.8130 DECREASED FETAL MOVEMENTS IN THIRD TRIMESTER, SINGLE OR UNSPECIFIED FETUS: Primary | ICD-10-CM

## 2019-07-29 LAB
AMPHET UR QL SCN: NEGATIVE
BARBITURATES UR QL SCN: NEGATIVE
BENZODIAZ UR QL: NEGATIVE
CANNABINOIDS UR QL SCN: NEGATIVE
COCAINE UR QL SCN: NEGATIVE
DRUG SCRN COMMENT,DRGCM: NORMAL
METHADONE UR QL: NEGATIVE
OPIATES UR QL: NEGATIVE
PCP UR QL: NEGATIVE

## 2019-07-29 PROCEDURE — 74011250636 HC RX REV CODE- 250/636: Performed by: OBSTETRICS & GYNECOLOGY

## 2019-07-29 PROCEDURE — 96360 HYDRATION IV INFUSION INIT: CPT

## 2019-07-29 PROCEDURE — 59025 FETAL NON-STRESS TEST: CPT

## 2019-07-29 PROCEDURE — 74011250637 HC RX REV CODE- 250/637: Performed by: OBSTETRICS & GYNECOLOGY

## 2019-07-29 PROCEDURE — 80307 DRUG TEST PRSMV CHEM ANLYZR: CPT

## 2019-07-29 PROCEDURE — 99285 EMERGENCY DEPT VISIT HI MDM: CPT

## 2019-07-29 PROCEDURE — 75810000275 HC EMERGENCY DEPT VISIT NO LEVEL OF CARE

## 2019-07-29 RX ORDER — ACETAMINOPHEN 500 MG
1000 TABLET ORAL ONCE
Status: COMPLETED | OUTPATIENT
Start: 2019-07-29 | End: 2019-07-29

## 2019-07-29 RX ORDER — ONDANSETRON 4 MG/1
4 TABLET, ORALLY DISINTEGRATING ORAL ONCE
Status: COMPLETED | OUTPATIENT
Start: 2019-07-29 | End: 2019-07-29

## 2019-07-29 RX ORDER — ONDANSETRON 2 MG/ML
4 INJECTION INTRAMUSCULAR; INTRAVENOUS ONCE
Status: DISCONTINUED | OUTPATIENT
Start: 2019-07-29 | End: 2019-07-29

## 2019-07-29 RX ORDER — FAMOTIDINE 20 MG/1
20 TABLET, FILM COATED ORAL 2 TIMES DAILY
Status: DISCONTINUED | OUTPATIENT
Start: 2019-07-29 | End: 2019-07-30 | Stop reason: HOSPADM

## 2019-07-29 RX ADMIN — SODIUM CHLORIDE, SODIUM LACTATE, POTASSIUM CHLORIDE, AND CALCIUM CHLORIDE 1000 ML: 600; 310; 30; 20 INJECTION, SOLUTION INTRAVENOUS at 20:45

## 2019-07-29 RX ADMIN — FAMOTIDINE 20 MG: 20 TABLET, FILM COATED ORAL at 21:42

## 2019-07-29 RX ADMIN — ACETAMINOPHEN 1000 MG: 500 TABLET ORAL at 21:42

## 2019-07-29 RX ADMIN — ONDANSETRON 4 MG: 4 TABLET, ORALLY DISINTEGRATING ORAL at 21:48

## 2019-07-30 ENCOUNTER — ROUTINE PRENATAL (OUTPATIENT)
Dept: OBGYN CLINIC | Age: 29
End: 2019-07-30

## 2019-07-30 VITALS
SYSTOLIC BLOOD PRESSURE: 123 MMHG | HEIGHT: 61 IN | DIASTOLIC BLOOD PRESSURE: 77 MMHG | HEART RATE: 106 BPM | BODY MASS INDEX: 29.83 KG/M2 | WEIGHT: 158 LBS

## 2019-07-30 DIAGNOSIS — D72.829 LEUKOCYTOSIS, UNSPECIFIED TYPE: ICD-10-CM

## 2019-07-30 DIAGNOSIS — Z3A.37 37 WEEKS GESTATION OF PREGNANCY: ICD-10-CM

## 2019-07-30 DIAGNOSIS — O09.33 LIMITED PRENATAL CARE IN THIRD TRIMESTER: Primary | ICD-10-CM

## 2019-07-30 LAB
HCT, EXTERNAL: 30.6
HGB, EXTERNAL: 9.7
PLATELET CNT,   EXTERNAL: 309

## 2019-07-30 NOTE — PATIENT INSTRUCTIONS
Counting Your Baby's Kicks: Care Instructions  Your Care Instructions    Counting your baby's kicks is one way your doctor can tell that your baby is healthy. Most women--especially in a first pregnancy--feel their baby move for the first time between 16 and 22 weeks. The movement may feel like flutters rather than kicks. Your baby may move more at certain times of the day. When you are active, you may notice less kicking than when you are resting. At your prenatal visits, your doctor will ask whether the baby is active. In your last trimester, your doctor may ask you to count the number of times you feel your baby move. Follow-up care is a key part of your treatment and safety. Be sure to make and go to all appointments, and call your doctor if you are having problems. It's also a good idea to know your test results and keep a list of the medicines you take. How do you count fetal kicks? · A common method of checking your baby's movement is to count the number of kicks or moves you feel in 1 hour. Ten movements (such as kicks, flutters, or rolls) in 1 hour are normal. Some doctors suggest that you count in the morning until you get to 10 movements. Then you can quit for that day and start again the next day. · Pick your baby's most active time of day to count. This may be any time from morning to evening. · If you do not feel 10 movements in an hour, your baby may be sleeping. Wait for the next hour and count again. When should you call for help? Call your doctor now or seek immediate medical care if:    · You noticed that your baby has stopped moving or is moving much less than normal.    Watch closely for changes in your health, and be sure to contact your doctor if you have any problems. Where can you learn more? Go to http://nolan-ibrahima.info/. Enter W836 in the search box to learn more about \"Counting Your Baby's Kicks: Care Instructions. \"  Current as of: September 5, 2018  Content Version: 12.1  © 3812-4078 Healthwise, Incorporated. Care instructions adapted under license by [employer] (which disclaims liability or warranty for this information). If you have questions about a medical condition or this instruction, always ask your healthcare professional. [address] any warranty or liability for your use of this information.

## 2019-07-30 NOTE — PROGRESS NOTES
On L&D last night - dehydrated. Disco PO fluids. WBC=21K, h/o leukocytosis prev preg -> rpt today. RTO 1wk.

## 2019-07-30 NOTE — DISCHARGE INSTRUCTIONS
Follow up at your next regularly scheduled visit. If you do not have an appointment scheduled, call the office tomorrow and have one scheduled. Judith Khanna Contractions: Care Instructions  Your Care Instructions  [place] contractions prepare your uterus for labor. Think of them as a \"warm-up\" exercise that your body does. You may begin to feel them between the 28th and 30th weeks of your pregnancy. But they start as early as the 20th week. [place] contractions usually occur more often during the ninth month. They may go away when you are active and return when you rest. These contractions are like mild contractions of true labor, but they occur less often. (You feel fewer than 8 in an hour.) They don't cause your cervix to open. It may be hard for you to tell the difference between Judith Khanna contractions and true labor, especially in your first pregnancy. Follow-up care is a key part of your treatment and safety. Be sure to make and go to all appointments, and call your doctor if you are having problems. It's also a good idea to know your test results and keep a list of the medicines you take. How can you care for yourself at home? · Try a warm bath to help relieve muscle tension and reduce pain. · Change positions every 30 minutes. Take breaks if you must sit for a long time. Get up and walk around. · Drink plenty of water, enough so that your urine is light yellow or clear like water. · Taking short walks may help you feel better. Your doctor needs to check any contractions that are getting stronger or closer together. Where can you learn more? Go to http://nolan-ibrahima.info/. Enter [id number] in the search box to learn more about \"[place] Contractions: Care Instructions. \"  Current as of: September 5, 2018  Content Version: 12.1  © 1682-8598 Healthwise, Incorporated.  Care instructions adapted under license by [employer] (which disclaims liability or warranty for this information). If you have questions about a medical condition or this instruction, always ask your healthcare professional. [address] any warranty or liability for your use of this information. Patient Education     Counting Your Baby's Kicks: Care Instructions  Your Care Instructions  Counting your baby's kicks is one way your doctor can tell that your baby is healthy. Most women--especially in a first pregnancy--feel their baby move for the first time between 16 and 22 weeks. The movement may feel like flutters rather than kicks. Your baby may move more at certain times of the day. When you are active, you may notice less kicking than when you are resting. At your prenatal visits, your doctor will ask whether the baby is active. In your last trimester, your doctor may ask you to count the number of times you feel your baby move. Follow-up care is a key part of your treatment and safety. Be sure to make and go to all appointments, and call your doctor if you are having problems. It's also a good idea to know your test results and keep a list of the medicines you take. How do you count fetal kicks? · A common method of checking your baby's movement is to count the number of kicks or moves you feel in 1 hour. Ten movements (such as kicks, flutters, or rolls) in 1 hour are normal. Some doctors suggest that you count in the morning until you get to 10 movements. Then you can quit for that day and start again the next day. · Pick your baby's most active time of day to count. This may be any time from morning to evening. · If you do not feel 10 movements in an hour, your baby may be sleeping. Wait for the next hour and count again. When should you call for help?   Call your doctor now or seek immediate medical care if:    · You noticed that your baby has stopped moving or is moving much less than normal.    Watch closely for changes in your health, and be sure to contact your doctor if you have any problems. Where can you learn more? Go to http://nolan-ibrahima.info/. Enter X266 in the search box to learn more about \"Counting Your Baby's Kicks: Care Instructions. \"  Current as of: September 5, 2018  Content Version: 12.1  © 7633-0186 [employer]. Care instructions adapted under license by [employer] (which disclaims liability or warranty for this information). If you have questions about a medical condition or this instruction, always ask your healthcare professional. [place] [address] any warranty or liability for your use of this information. Patient Education        Week 37 of Your Pregnancy: Care Instructions  Your Care Instructions  You are near the end of your pregnancy--and you're probably pretty uncomfortable. It may be harder to walk around. Lying down probably isn't comfortable either. You may have trouble getting to sleep or staying asleep. Most women deliver their babies between 40 and 41 weeks. This is a good time to think about packing a bag for the hospital with items you'll need. Then you'll be ready when labor starts. Follow-up care is a key part of your treatment and safety. Be sure to make and go to all appointments, and call your doctor if you are having problems. It's also a good idea to know your test results and keep a list of the medicines you take. How can you care for yourself at home? Learn about breastfeeding  · Breastfeeding is best for your baby and good for you. · Breast milk has antibodies to help your baby fight infections. · Mothers who breastfeed often lose weight faster, because making milk burns calories. · Learning the best ways to hold your baby will make breastfeeding easier. · Let your partner bathe and diaper the baby to keep your partner from feeling left out. Snuggle together when you breastfeed.   · You may want to learn how to use a breast pump and store your milk.  · If you choose to bottle feed, make the feeding feel like breastfeeding so you can bond with your baby. Always hold your baby and the bottle. Do not prop bottles or let your baby fall asleep with a bottle. Learn about crying  · It is common for babies to cry for 1 to 3 hours a day. Some cry more, some cry less. · Babies don't cry to make you upset or because you are a bad parent. · Crying is how your baby communicates. Your baby may be hungry; have gas; need a diaper change; or feel cold, warm, tired, lonely, or tense. Sometimes babies cry for unknown reasons. · If you respond to your baby's needs, he or she will learn to trust you. · Try to stay calm when your baby cries. Your baby may get more upset if he or she senses that you are upset. Know how to care for your   · Your baby's umbilical cord stump will drop off on its own, usually between 1 and 2 weeks. To care for your baby's umbilical cord area:  ? Clean the area at the bottom of the cord 2 or 3 times a day. ? Pay special attention to the area where the cord attaches to the skin. ? Keep the diaper folded below the cord. ? Use a damp washcloth or cotton ball to sponge bathe your baby until the stump has come off. · Your baby's first dark stool is called meconium. After the meconium is passed, your baby will develop his or her own bowel pattern. ? Some babies, especially  babies, have several bowel movements a day. Others have one or two a day, or one every 2 to 3 days. ?  babies often have loose, yellow stools. Formula-fed babies have more formed stools. ? If your baby's stools look like little pellets, he or she is constipated. After 2 days of constipation, call your baby's doctor. · If your baby will be circumcised, you can care for him at home. ? Gently rinse his penis with warm water after every diaper change. Do not try to remove the film that forms on the penis. This film will go away on its own.  Racheal Ramos dry.  ? Put petroleum ointment, such as Vaseline, on the area of the diaper that will touch your baby's penis. This will keep the diaper from sticking to your baby. ? Ask the doctor about giving your baby acetaminophen (Tylenol) for pain. Where can you learn more? Go to http://nolan-ibrahima.info/. Enter 68 [id number] in the search box to learn more about \"Week 37 of Your Pregnancy: Care Instructions. \"  Current as of: 2018  Content Version: 12.1  © 9903-7723 [employer]. Care instructions adapted under license by "[employer] (which disclaims liability or warranty for this information). If you have questions about a medical condition or this instruction, always ask your healthcare professional. [address] any warranty or liability for your use of this information. Pregnancy Precautions: Care Instructions  Your Care Instructions  There is no sure way to prevent labor before your due date ( labor) or to prevent most other pregnancy problems. But there are things you can do to increase your chances of a healthy pregnancy. Go to your appointments, follow your doctor's advice, and take good care of yourself. Eat well, and exercise (if your doctor agrees). And make sure to drink plenty of water. Follow-up care is a key part of your treatment and safety. Be sure to make and go to all appointments, and call your doctor if you are having problems. It's also a good idea to know your test results and keep a list of the medicines you take. How can you care for yourself at home? · Make sure you go to your prenatal appointments. At each visit, your doctor will check your blood pressure. Your doctor will also check to see if you have protein in your urine. High blood pressure and protein in urine are signs of preeclampsia. This condition can be dangerous for you and your baby.   · Drink plenty of fluids, enough so that your urine is light yellow or clear like water. Dehydration can cause contractions. If you have kidney, heart, or liver disease and have to limit fluids, talk with your doctor before you increase the amount of fluids you drink. · Tell your doctor right away if you notice any symptoms of an infection, such as:  ? Burning when you urinate. ? A foul-smelling discharge from your vagina. ? Vaginal itching. ? Unexplained fever. ? Unusual pain or soreness in your uterus or lower belly. · Eat a balanced diet. Include plenty of foods that are high in calcium and iron. ? Foods high in calcium include milk, cheese, yogurt, almonds, and broccoli. ? Foods high in iron include red meat, shellfish, poultry, eggs, beans, raisins, whole-grain bread, and leafy green vegetables. · Do not smoke. If you need help quitting, talk to your doctor about stop-smoking programs and medicines. These can increase your chances of quitting for good. · Do not drink alcohol or use illegal drugs. · Follow your doctor's directions about activity. Your doctor will let you know how much, if any, exercise you can do. · Ask your doctor if you can have sex. If you are at risk for early labor, your doctor may ask you to not have sex. · Take care to prevent falls. During pregnancy, your joints are loose, and your balance is off. Sports such as bicycling, skiing, or in-line skating can increase your risk of falling. And don't ride horses or motorcycles, dive, water ski, scuba dive, or parachute jump while you are pregnant. · Avoid getting very hot. Do not use saunas or hot tubs. Avoid staying out in the sun in hot weather for long periods. Take acetaminophen (Tylenol) to lower a high fever. · Do not take any over-the-counter or herbal medicines or supplements without talking to your doctor or pharmacist first.  When should you call for help? [id number] anytime you think you may need emergency care.  For example, call if:    · You passed out (lost consciousness).     · You have a seizure.     · You have severe vaginal bleeding.     · You have severe pain in your belly or pelvis.     · You have had fluid gushing or leaking from your vagina and you know or think the umbilical cord is bulging into your vagina. If this happens, immediately get down on your knees so your rear end (buttocks) is higher than your head. This will decrease the pressure on the cord until help arrives.   [facility] your doctor now or seek immediate medical care if:    · You have signs of preeclampsia, such as:  ? Sudden swelling of your face, hands, or feet. ? New vision problems (such as dimness, blurring, or seeing spots). ? A severe headache.     · You have any vaginal bleeding.     · You have belly pain or cramping.     · You have a fever.     · You have had regular contractions (with or without pain) for an hour. This means that you have 8 or more within 1 hour or 4 or more in 20 minutes after you change your position and drink fluids.     · You have a sudden release of fluid from your vagina.     · You have low back pain or pelvic pressure that does not go away.     · You notice that your baby has stopped moving or is moving much less than normal.    Watch closely for changes in your health, and be sure to contact your doctor if you have any problems. Where can you learn more? Go to http://nolan-ibrahima.info/. Enter [id number] in the search box to learn more about \"Pregnancy Precautions: Care Instructions. \"  Current as of: September 5, 2018  Content Version: 12.1  © 5370-8295 Healthwise, Incorporated. Care instructions adapted under license by [employer] (which disclaims liability or warranty for this information). If you have questions about a medical condition or this instruction, always ask your healthcare professional. [address] any warranty or liability for your use of this information.

## 2019-07-30 NOTE — ED PROVIDER NOTES
HPI   Pt seen in triage briefly, c/o decreased FM. To L&D.   Past Medical History:   Diagnosis Date    Abnormal Pap smear of cervix 02/22/2019    LGSIL -> OB Colpo nohelia'd for 3/12/19    Allergic to cats     Anemia     taking PO iron    Anxiety and depression     managed by PCP    Genital herpes     taking valtrex    GERD (gastroesophageal reflux disease)     Nexplanon removal 07/10/2017    Was inserted on 11/11/16    Nicotine vapor product user     Postpartum depression     Screening for malignant neoplasm of the cervix 5/26/16    Negative (no hpv)    Stress     Susceptible to varicella (non-immune), currently pregnant 6/2016    vaccinate PP       Past Surgical History:   Procedure Laterality Date    HX APPENDECTOMY  12/14/2017    HX COLPOSCOPY  07/23/2019    HX WISDOM TEETH EXTRACTION  2012    Also gum graph         Family History:   Problem Relation Age of Onset    Heart Attack Mother     Lupus Mother     Heart Attack Father         x 2    Heart Disease Father     Hypertension Father        Social History     Socioeconomic History    Marital status: SINGLE     Spouse name: Not on file    Number of children: Not on file    Years of education: Not on file    Highest education level: Not on file   Occupational History    Not on file   Social Needs    Financial resource strain: Not on file    Food insecurity:     Worry: Not on file     Inability: Not on file    Transportation needs:     Medical: Not on file     Non-medical: Not on file   Tobacco Use    Smoking status: Current Every Day Smoker     Packs/day: 0.25     Years: 11.00     Pack years: 2.75     Types: Cigarettes    Smokeless tobacco: Never Used    Tobacco comment: Never used vapor or e-cigs    Substance and Sexual Activity    Alcohol use: No     Alcohol/week: 0.0 standard drinks    Drug use: No    Sexual activity: Yes     Partners: Male   Lifestyle    Physical activity:     Days per week: Not on file     Minutes per session: Not on file    Stress: Not on file   Relationships    Social connections:     Talks on phone: Not on file     Gets together: Not on file     Attends [employer]ism service: Not on file     Active member of club or organization: Not on file     Attends meetings of clubs or organizations: Not on file     Relationship status: Not on file    Intimate partner violence:     Fear of current or ex partner: Not on file     Emotionally abused: Not on file     Physically abused: Not on file     Forced sexual activity: Not on file   Other Topics Concern     Service Not Asked    Blood Transfusions Not Asked    Caffeine Concern Not Asked    Occupational Exposure Not Asked   Shemar Hahn Hazards Not Asked    Sleep Concern Not Asked    Stress Concern Not Asked    Weight Concern Not Asked    Special Diet Not Asked    Back Care Not Asked    Exercise Not Asked    Bike Helmet Not Asked   [address] [facility] Not Asked    Self-Exams Not Asked   Social History Narrative    Not on file         ALLERGIES: Sulfa (sulfonamide antibiotics)    Review of Systems    Vitals:    07/29/19 2019 07/29/19 2023 07/29/19 2024 07/29/19 2029   BP:  135/84     Pulse:  (!) 109     Resp:  16     Temp:  98.6 °F (37 °C)     SpO2: 99%  98% 99%   Weight:       Height:                Physical Exam     MDM       Procedures

## 2019-07-30 NOTE — H&P
Pt is a 26 y. o.female. [id number] The patient presents with complaint of nausea , lightheadedness and fatigue. The patient denies LOF, vaginal bleeding, /V/F/C. Pt reports good fetal movement. Pregnancy uncomplicated. Past Medical History:   Diagnosis Date    Abnormal Pap smear of cervix 02/22/2019    LGSIL -> OB Colpo nohelia'd for 3/12/19    Allergic to cats     Anemia     taking PO iron    Anxiety and depression     managed by PCP    Genital herpes     taking valtrex    GERD (gastroesophageal reflux disease)     Nexplanon removal 07/10/2017    Was inserted on 11/11/16    Nicotine vapor product user     Postpartum depression     Screening for malignant neoplasm of the cervix 5/26/16    Negative (no hpv)    Stress     Susceptible to varicella (non-immune), currently pregnant 6/2016    vaccinate PP       Visit Vitals  /84 (BP 1 Location: Left arm, BP Patient Position: At rest)   Pulse (!) 109   Temp 98.6 °F (37 °C)   Resp 16   Ht 5' 2\" (1.575 m)   Wt 71.7 kg (158 lb)   SpO2 99%   BMI 28.90 kg/m²       No data found.          EXAM:  Cervical Exam:  Closed/Thick/High  Membranes:  Intact  Uterine Activity: None  Fetal Heart Rate: Reactive    Labs:  Recent Results (from the past 12 hour(s))   DRUG SCREEN, URINE    Collection Time: 07/29/19  9:50 PM   Result Value Ref Range    AMPHETAMINES NEGATIVE  NEG      BARBITURATES NEGATIVE  NEG      BENZODIAZEPINES NEGATIVE  NEG      COCAINE NEGATIVE  NEG      METHADONE NEGATIVE  NEG      OPIATES NEGATIVE  NEG      PCP(PHENCYCLIDINE) NEGATIVE  NEG      THC (TH-CANNABINOL) NEGATIVE  NEG      Drug screen comment (NOTE)        Results for orders placed or performed in visit on 04/07/14   AMB POC URINALYSIS DIP STICK AUTO W/O MICRO     Status: None   Result Value Ref Range Status    Color (UA POC) Dark Leslie  Final    Clarity (UA POC) Turbid  Final    Glucose (UA POC) Negative Negative Final    Bilirubin (UA POC) Negative Negative Final    Ketones (UA POC) 4+ Negative Final    Specific gravity (UA POC) 1.015 1.001 - 1.035 Final    Blood (UA POC) Negative Negative Final    pH (UA POC) 4.6 4.6 - 8.0 Final    Protein (UA POC) 4+ Negative mg/dL Final    Urobilinogen (UA POC) 0.2 mg/dL 0.2 - 1 Final    Nitrites (UA POC) Negative Negative Final    Leukocyte esterase (UA POC) Negative Negative Final           ASSESSMENT:      IUP @ 37 weeks  Nausea, reflux      PLAN:    Fluids, pepcid, zofran observation  D/C home with office follow up

## 2019-07-30 NOTE — PROGRESS NOTES
2000:  Patient presents from ED with complaints including fatigue, \"feeling weird\", a little nauseous and lightheaded. She states she felt like this prior to her last baby where she came in for \"feeling off\" and they gave her fluids and then she stayed and delivered. 2030Clive MD Yulia at bedside. Orders received for IVF bolus, zofran, pepcid. 2204:  Discharge order received from MD Belinda.      9654:  Discharge instructions reviewed with patient including week 37 gestation, when to follow up, fetal kick counts, pregnancy precautions and importance of calling the on call physician prior to coming to hospital.  She verbalizes understanding. Opportunity for questions provided. 2225: Patient and family ambulated off unit after discharge.

## 2019-07-31 LAB
BASOPHILS # BLD AUTO: 0.1 X10E3/UL (ref 0–0.2)
BASOPHILS NFR BLD AUTO: 0 %
EOSINOPHIL # BLD AUTO: 0.6 X10E3/UL (ref 0–0.4)
EOSINOPHIL NFR BLD AUTO: 3 %
ERYTHROCYTE [DISTWIDTH] IN BLOOD BY AUTOMATED COUNT: 16.9 % (ref 12.3–15.4)
HCT VFR BLD AUTO: 30.6 % (ref 34–46.6)
HGB BLD-MCNC: 9.7 G/DL (ref 11.1–15.9)
IMM GRANULOCYTES # BLD AUTO: 0.1 X10E3/UL (ref 0–0.1)
IMM GRANULOCYTES NFR BLD AUTO: 1 %
LYMPHOCYTES # BLD AUTO: 4 X10E3/UL (ref 0.7–3.1)
LYMPHOCYTES NFR BLD AUTO: 21 %
MCH RBC QN AUTO: 27.5 PG (ref 26.6–33)
MCHC RBC AUTO-ENTMCNC: 31.7 G/DL (ref 31.5–35.7)
MCV RBC AUTO: 87 FL (ref 79–97)
MONOCYTES # BLD AUTO: 1.3 X10E3/UL (ref 0.1–0.9)
MONOCYTES NFR BLD AUTO: 7 %
NEUTROPHILS # BLD AUTO: 12.7 X10E3/UL (ref 1.4–7)
NEUTROPHILS NFR BLD AUTO: 68 %
PLATELET # BLD AUTO: 309 X10E3/UL (ref 150–450)
RBC # BLD AUTO: 3.53 X10E6/UL (ref 3.77–5.28)
WBC # BLD AUTO: 18.8 X10E3/UL (ref 3.4–10.8)

## 2019-08-05 ENCOUNTER — APPOINTMENT (OUTPATIENT)
Dept: ULTRASOUND IMAGING | Age: 29
End: 2019-08-05
Attending: STUDENT IN AN ORGANIZED HEALTH CARE EDUCATION/TRAINING PROGRAM
Payer: MEDICAID

## 2019-08-05 ENCOUNTER — HOSPITAL ENCOUNTER (EMERGENCY)
Age: 29
Discharge: HOME | End: 2019-08-05
Attending: STUDENT IN AN ORGANIZED HEALTH CARE EDUCATION/TRAINING PROGRAM
Payer: MEDICAID

## 2019-08-05 VITALS
OXYGEN SATURATION: 99 % | WEIGHT: 160 LBS | HEART RATE: 77 BPM | DIASTOLIC BLOOD PRESSURE: 77 MMHG | RESPIRATION RATE: 20 BRPM | SYSTOLIC BLOOD PRESSURE: 140 MMHG | HEIGHT: 61 IN | TEMPERATURE: 98.4 F | BODY MASS INDEX: 30.21 KG/M2

## 2019-08-05 DIAGNOSIS — R10.11 RUQ ABDOMINAL PAIN: Primary | ICD-10-CM

## 2019-08-05 LAB
COMMENT, HOLDF: NORMAL
SAMPLES BEING HELD,HOLD: NORMAL

## 2019-08-05 PROCEDURE — 36415 COLL VENOUS BLD VENIPUNCTURE: CPT

## 2019-08-05 PROCEDURE — 99283 EMERGENCY DEPT VISIT LOW MDM: CPT

## 2019-08-05 PROCEDURE — 76705 ECHO EXAM OF ABDOMEN: CPT

## 2019-08-06 ENCOUNTER — ROUTINE PRENATAL (OUTPATIENT)
Dept: OBGYN CLINIC | Age: 29
End: 2019-08-06

## 2019-08-06 VITALS
HEART RATE: 108 BPM | BODY MASS INDEX: 29.64 KG/M2 | SYSTOLIC BLOOD PRESSURE: 109 MMHG | DIASTOLIC BLOOD PRESSURE: 63 MMHG | HEIGHT: 61 IN | WEIGHT: 157 LBS

## 2019-08-06 DIAGNOSIS — O09.33 LIMITED PRENATAL CARE IN THIRD TRIMESTER: Primary | ICD-10-CM

## 2019-08-06 DIAGNOSIS — Z3A.38 38 WEEKS GESTATION OF PREGNANCY: ICD-10-CM

## 2019-08-06 NOTE — DISCHARGE INSTRUCTIONS

## 2019-08-06 NOTE — PROGRESS NOTES
+FM. RUQ pain, went to ER last PM, RUQ US wnl. Sugg trial of Zantac. RTO 1wk.      - U=D. Will use LMP for datin18 -> CHANDA=19  - late PNC (14wks), not seen again until 34wks  - +tobacco -> has decr to 1cig/d  - On Lexapro -> MFM US nl  - Anemia. () hgb=9.1, Fe sat=3%, ferritin=7 (nl on NOB labs Feb) -> OTC iron TID ** ___  - Varicella NONIMMUNE  - wants Panorama and Horizon (CF-97 neg with prev preg) -> test kit given  - anxiety, depression, h/o PP depression  - +HSV -> Valtrex @ 34-36wks. - h/o leukocytosis with previous pregnancy. WBC=14.7 on NOB labs (14wks)  - WBC ()=21 -> rpt ** ___  - MFM US (4/3/19) 20+2 @20+3. Nl morph. XX. Fundal placenta. - LGSIL -> colpo ** ___  -IOL (19). Patient notified.

## 2019-08-06 NOTE — PATIENT INSTRUCTIONS

## 2019-08-06 NOTE — ED PROVIDER NOTES
26 y.o. female with past medical history significant for anemia who presents via private vehicle from home accompanied by her  with chief complaint of abdominal pain. Patient arrives c/o intermittent RUQ pain x1 month, worse tonight. Patient informed who OB/GYN at initial onset who noted that her pain was likely d/t her uterus growing, but called her again tonight concerning worsening symptoms and she referred her here for US and further evaluation. Patient endorses Hx of appendectomy and elevated WBC with previous pregnancies. There are no other acute medical concerns at this time.     Social hx: Former tobacco smoker; Denies EtOH use; Denies illicit drug use  PCP: Yomaira Ramon MD    Note written by Twila Hall, as dictated by Aleida Muniz MD 8:54 PM           Past Medical History:   Diagnosis Date    Abnormal Pap smear of cervix 02/22/2019    LGSIL -> OB Colpo nohelia'd for 3/12/19    Allergic to cats     Anemia     taking PO iron    Anxiety and depression     managed by PCP    Genital herpes     taking valtrex    GERD (gastroesophageal reflux disease)     Nexplanon removal 07/10/2017    Was inserted on 11/11/16    Nicotine vapor product user     Postpartum depression     Screening for malignant neoplasm of the cervix 5/26/16    Negative (no hpv)    Stress     Susceptible to varicella (non-immune), currently pregnant 6/2016    vaccinate PP       Past Surgical History:   Procedure Laterality Date    HX APPENDECTOMY  12/14/2017    HX COLPOSCOPY  07/23/2019    HX WISDOM TEETH EXTRACTION  2012    Also gum graph         Family History:   Problem Relation Age of Onset    Heart Attack Mother     Lupus Mother     Heart Attack Father         x 2    Heart Disease Father     Hypertension Father        Social History     Socioeconomic History    Marital status: SINGLE     Spouse name: Not on file    Number of children: Not on file    Years of education: Not on file   Doc Highest education level: Not on file   Occupational History    Not on file   Social Needs    Financial resource strain: Not on file    Food insecurity:     Worry: Not on file     Inability: Not on file    Transportation needs:     Medical: Not on file     Non-medical: Not on file   Tobacco Use    Smoking status: Current Every Day Smoker     Packs/day: 0.25     Years: 11.00     Pack years: 2.75     Types: Cigarettes    Smokeless tobacco: Never Used    Tobacco comment: Never used vapor or e-cigs    Substance and Sexual Activity    Alcohol use: No     Alcohol/week: 0.0 standard drinks    Drug use: No    Sexual activity: Yes     Partners: Male   Lifestyle    Physical activity:     Days per week: Not on file     Minutes per session: Not on file    Stress: Not on file   Relationships    Social connections:     Talks on phone: Not on file     Gets together: Not on file     Attends [employer] service: Not on file     Active member of club or organization: Not on file     Attends meetings of clubs or organizations: Not on file     Relationship status: Not on file    Intimate partner violence:     Fear of current or ex partner: Not on file     Emotionally abused: Not on file     Physically abused: Not on file     Forced sexual activity: Not on file   Other Topics Concern     Service Not Asked    Blood Transfusions Not Asked    Caffeine Concern Not Asked    Occupational Exposure Not Asked   Fatou Daubs Hazards Not Asked    Sleep Concern Not Asked    Stress Concern Not Asked    Weight Concern Not Asked    Special Diet Not Asked    Back Care Not Asked    Exercise Not Asked    Bike Helmet Not Asked   [address] Not Asked    Self-Exams Not Asked   Social History Narrative    Not on file         ALLERGIES: Sulfa (sulfonamide antibiotics)    Review of Systems   Constitutional: Negative for activity change, diaphoresis, fatigue and fever. HENT: Negative for congestion and sore throat.     Eyes: Negative for photophobia and visual disturbance. Respiratory: Negative for chest tightness and shortness of breath. Cardiovascular: Negative for chest pain, palpitations and leg swelling. Gastrointestinal: Positive for abdominal pain (RUQ). Negative for blood in stool, constipation, diarrhea, nausea and vomiting. Genitourinary: Negative for difficulty urinating, dysuria, flank pain, frequency and hematuria. Musculoskeletal: Negative for back pain. Neurological: Negative for dizziness, syncope, numbness and headaches. All other systems reviewed and are negative. Vitals:    08/05/19 2036   BP: (!) 138/93   Pulse: (!) 107   Resp: 18   Temp: 98.7 °F (37.1 °C)   SpO2: 98%   Weight: 72.6 kg (160 lb)   Height: 5' 1\" (1.549 m)            Physical Exam   Constitutional: She is oriented to person, place, and time. She appears well-developed and well-nourished. No distress. HENT:   Head: Normocephalic and atraumatic. Nose: Nose normal.   Mouth/Throat: Oropharynx is clear and moist. No oropharyngeal exudate. Eyes: Conjunctivae and EOM are normal. Right eye exhibits no discharge. Left eye exhibits no discharge. No scleral icterus. Neck: Normal range of motion. Neck supple. No JVD present. No tracheal deviation present. No thyromegaly present. Cardiovascular: Normal rate, regular rhythm, normal heart sounds and intact distal pulses. Exam reveals no gallop and no friction rub. No murmur heard. Pulmonary/Chest: Effort normal and breath sounds normal. No stridor. No respiratory distress. She has no wheezes. She has no rales. She exhibits no tenderness. Abdominal: Bowel sounds are normal. She exhibits no distension and no mass. There is tenderness in the right upper quadrant. There is no rebound. RUQ tenderness. Genitourinary:   Genitourinary Comments: Gravid uterus c/w dates. Musculoskeletal: Normal range of motion. She exhibits no edema or tenderness.    Lymphadenopathy:     She has no cervical adenopathy. Neurological: She is alert and oriented to person, place, and time. No cranial nerve deficit. Coordination normal.   Skin: Skin is warm and dry. No rash noted. She is not diaphoretic. No erythema. No pallor. Psychiatric: She has a normal mood and affect. Her behavior is normal. Judgment and thought content normal.   Nursing note and vitals reviewed. Note written by Twila Johnson, as dictated by Warren Nissen, MD 8:54 PM    MDM  Number of Diagnoses or Management Options  RUQ abdominal pain:   Diagnosis management comments: A/P: Abdominal pain, cholelithiasis, cholecystitis. 76-year-old female presenting with right upper quadrant abdominal pain patient as well as OB/GYN concern for cholelithiasis versus cholecystitis patient is currently 26 weeks gestational age. Ultrasound right upper quadrant. Ultrasound negative for cholelithiasis versus cholecystitis unremarkable ultrasound pain likely secondary to fetus. Patient to follow-up with OB/GYN. Amount and/or Complexity of Data Reviewed  Tests in the radiology section of CPT®: reviewed and ordered  Independent visualization of images, tracings, or specimens: yes    Risk of Complications, Morbidity, and/or Mortality  Presenting problems: moderate  Diagnostic procedures: moderate  Management options: moderate    Patient Progress  Patient progress: stable         Procedures    9:47 PM  Patient's results have been reviewed with them. Patient and/or family have verbally conveyed their understanding and agreement of the patient's signs, symptoms, diagnosis, treatment and prognosis and additionally agree to follow up as recommended or return to the Emergency Room should their condition change prior to follow-up.   Discharge instructions have also been provided to the patient with some educational information regarding their diagnosis as well a list of reasons why they would want to return to the ER prior to their follow-up appointment should their condition change.

## 2019-08-06 NOTE — ED TRIAGE NOTES
Patient reports sharp RUQ pain that woke her up from her sleep last night but pain has been ongoing since July. Was referred by PCP to ER to have gall bladder looked at. Patient is 38 weeks pregnant.

## 2019-08-13 ENCOUNTER — ROUTINE PRENATAL (OUTPATIENT)
Dept: OBGYN CLINIC | Age: 29
End: 2019-08-13

## 2019-08-13 VITALS
HEART RATE: 112 BPM | BODY MASS INDEX: 29.83 KG/M2 | SYSTOLIC BLOOD PRESSURE: 125 MMHG | HEIGHT: 61 IN | WEIGHT: 158 LBS | DIASTOLIC BLOOD PRESSURE: 84 MMHG

## 2019-08-13 DIAGNOSIS — O36.8190 DECREASED FETAL MOVEMENT DURING PREGNANCY, ANTEPARTUM, SINGLE OR UNSPECIFIED FETUS: ICD-10-CM

## 2019-08-13 DIAGNOSIS — N89.8 VAGINAL DISCHARGE DURING PREGNANCY IN THIRD TRIMESTER: ICD-10-CM

## 2019-08-13 DIAGNOSIS — O26.893 VAGINAL DISCHARGE DURING PREGNANCY IN THIRD TRIMESTER: ICD-10-CM

## 2019-08-13 DIAGNOSIS — Z3A.39 39 WEEKS GESTATION OF PREGNANCY: ICD-10-CM

## 2019-08-13 DIAGNOSIS — Z34.83 PRENATAL CARE, SUBSEQUENT PREGNANCY, THIRD TRIMESTER: Primary | ICD-10-CM

## 2019-08-13 LAB
FERN TEST, (POC): NORMAL
PH BODY FLUID, POCT, PHBFPOCT: 4.5
SOURCE POCT, SRCEPOCT: NORMAL

## 2019-08-13 NOTE — PATIENT INSTRUCTIONS
Week 39 of Your Pregnancy: Care Instructions  Your Care Instructions    During these final weeks, you may feel anxious to see your new baby. [place] babies often look different from what you see in pictures or movies. Right after birth, their heads may have a strange shape. Their eyes may be puffy. And their genitals may be swollen. They may also have very dry skin, or red marks on the eyelids, nose, or neck. Still, most parents think their babies are beautiful. Follow-up care is a key part of your treatment and safety. Be sure to make and go to all appointments, and call your doctor if you are having problems. It's also a good idea to know your test results and keep a list of the medicines you take. How can you care for yourself at home? Prepare to breastfeed  · If you are breastfeeding, continue to eat healthy foods. · If your health care provider recommends it, keep taking your prenatal vitamins. · Talk to your doctor before you take any medicine or supplement. That's because some medicines can affect your breast milk. · You can help prevent sore nipples if you feed your baby in the correct position. Nurses will help you learn to do this. · Your  will need to be fed about every 1 to 3 hours. Choose the right birth control after your baby is born  · Women who are breastfeeding can still get pregnant. Use birth control if you don't want to get pregnant. · Intrauterine devices (IUDs) are very effective at preventing pregnancy and can provide birth control for 3 to 10 years, depending on the type. If you talk with your doctor before having your baby, the IUD can be placed right after you give birth. If you decide you want to get pregnant later, you can have it removed. They are safe to use while you are breastfeeding. · A hormonal implant is also very effective at preventing pregnancy. It is placed under the skin of the arm. This can be done right after you give birth.  It releases the hormone progestin and prevents pregnancy for about 3 years. This can also be removed by a doctor at any time. It is safe to use while you are breastfeeding. · Depo-Provera can be used while you are breastfeeding. It is a shot you get every 3 months. · Birth control pills work well. But you need a different kind of pill for the first few weeks after giving birth. And when you start taking these pills, you need to make sure to use another type of birth control for 7 days after you start your pack. · Diaphragms, cervical caps, and condoms with spermicide work less well after birth. If you have a diaphragm or cervical cap, talk to your doctor to see if you need a different size. · Tubal ligation (tying your tubes) and vasectomy are both permanent. These are good options if you are sure you are done having children. Where can you learn more? Go to http://nolan-ibrahima.info/. Enter C818 in the search box to learn more about \"Week 39 of Your Pregnancy: Care Instructions. \"  Current as of: September 5, 2018  Content Version: 12.1  © 6661-9964 [employer]. Care instructions adapted under license by [employer] (which disclaims liability or warranty for this information). If you have questions about a medical condition or this instruction, always ask your healthcare professional. [address] any warranty or liability for your use of this information.

## 2019-08-13 NOTE — PROGRESS NOTES
NST procedure note    Shine Jack is a ,  26 y.o. female [facility] [place] whose LMP  was on  who presents for fetal non-stress test. Decreased FM. She is 39w0d and was monitored for 23 minutes and 47 minutes. Initial tracing reassuring but not reactive. Pt returned after lunch. NST reactive. BPP=10/10.  +FM. NST Interpretation:    FHR baseline 125 bpm, variability moderate, accelerations present, decelerations Absent. Uterine irritability with single contraction. Samira Donaldson was informed of the NST results and her questions were answered.     Disposition:    - return to clinic as scheduled

## 2019-08-13 NOTE — PROGRESS NOTES
?LOF - this AM, \"feels wet\", but underwear, still dry. No VB, some cramping, not sure if she is having contractions. PE: neg pooling/ferning/Ntz. NST - initial reassuring, but NR. Returned after lunch. NST reactive. BPP=10/10. SILAS=15. Labor/ROM/FM/pre-e prec. RTO 1wk.      - U=D. Will use LMP for datin18 -> CHANDA=19  - late PNC (14wks), not seen again until 34wks  - +tobacco -> has decr to 1cig/d  - On Lexapro -> MFM US nl  - Anemia. () hgb=9.1, Fe sat=3%, ferritin=7 (nl on NOB labs Feb) -> OTC iron TID ** ___  - Varicella NONIMMUNE  - wants Panorama and Horizon (CF-97 neg with prev preg) -> test kit given  - anxiety, depression, h/o PP depression  - +HSV -> Valtrex @ 34-36wks. - h/o leukocytosis with previous pregnancy. WBC=14.7 on NOB labs (14wks)  - WBC ()=21 -> rpt ** ___  - MFM US (4/3/19) 20+2 @20+3. Nl morph. XX. Fundal placenta. - LGSIL -> colpo () -> rpt pap 2020  -IOL (19). Patient notified.

## 2019-08-19 ENCOUNTER — ANESTHESIA EVENT (OUTPATIENT)
Dept: LABOR AND DELIVERY | Age: 29
DRG: 541 | End: 2019-08-19
Payer: MEDICAID

## 2019-08-19 ENCOUNTER — HOSPITAL ENCOUNTER (INPATIENT)
Age: 29
LOS: 3 days | Discharge: HOME | DRG: 541 | End: 2019-08-22
Attending: OBSTETRICS & GYNECOLOGY | Admitting: OBSTETRICS & GYNECOLOGY
Payer: MEDICAID

## 2019-08-19 ENCOUNTER — ANESTHESIA (OUTPATIENT)
Dept: LABOR AND DELIVERY | Age: 29
DRG: 541 | End: 2019-08-19
Payer: MEDICAID

## 2019-08-19 PROBLEM — O14.90 PRE-ECLAMPSIA: Status: ACTIVE | Noted: 2019-08-19

## 2019-08-19 LAB
ABO + RH BLD: NORMAL
ALBUMIN SERPL-MCNC: 2.4 G/DL (ref 3.5–5)
ALBUMIN/GLOB SERPL: 0.6 {RATIO} (ref 1.1–2.2)
ALP SERPL-CCNC: 220 U/L (ref 45–117)
ALT SERPL-CCNC: 10 U/L (ref 12–78)
ANION GAP SERPL CALC-SCNC: 9 MMOL/L (ref 5–15)
AST SERPL-CCNC: 12 U/L (ref 15–37)
BILIRUB SERPL-MCNC: 0.6 MG/DL (ref 0.2–1)
BLOOD GROUP ANTIBODIES SERPL: NORMAL
BUN SERPL-MCNC: 3 MG/DL (ref 6–20)
BUN/CREAT SERPL: 6 (ref 12–20)
CALCIUM SERPL-MCNC: 8.4 MG/DL (ref 8.5–10.1)
CHLORIDE SERPL-SCNC: 108 MMOL/L (ref 97–108)
CO2 SERPL-SCNC: 22 MMOL/L (ref 21–32)
CREAT SERPL-MCNC: 0.5 MG/DL (ref 0.55–1.02)
CREAT UR-MCNC: 38.6 MG/DL
ERYTHROCYTE [DISTWIDTH] IN BLOOD BY AUTOMATED COUNT: 17.2 % (ref 11.5–14.5)
GLOBULIN SER CALC-MCNC: 3.8 G/DL (ref 2–4)
GLUCOSE SERPL-MCNC: 74 MG/DL (ref 65–100)
HCT VFR BLD AUTO: 32.2 % (ref 35–47)
HGB BLD-MCNC: 9.9 G/DL (ref 11.5–16)
MCH RBC QN AUTO: 25.5 PG (ref 26–34)
MCHC RBC AUTO-ENTMCNC: 30.7 G/DL (ref 30–36.5)
MCV RBC AUTO: 83 FL (ref 80–99)
NRBC # BLD: 0.06 K/UL (ref 0–0.01)
NRBC BLD-RTO: 0.3 PER 100 WBC
PLATELET # BLD AUTO: 203 K/UL (ref 150–400)
PMV BLD AUTO: 12 FL (ref 8.9–12.9)
POTASSIUM SERPL-SCNC: 3.8 MMOL/L (ref 3.5–5.1)
PROT SERPL-MCNC: 6.2 G/DL (ref 6.4–8.2)
PROT UR-MCNC: 15 MG/DL (ref 0–11.9)
PROT/CREAT UR-RTO: 0.4
RBC # BLD AUTO: 3.88 M/UL (ref 3.8–5.2)
SODIUM SERPL-SCNC: 139 MMOL/L (ref 136–145)
SPECIMEN EXP DATE BLD: NORMAL
WBC # BLD AUTO: 17.9 K/UL (ref 3.6–11)

## 2019-08-19 PROCEDURE — 65270000029 HC RM PRIVATE

## 2019-08-19 PROCEDURE — 76060000078 HC EPIDURAL ANESTHESIA: Performed by: ANESTHESIOLOGY

## 2019-08-19 PROCEDURE — 77030014125 HC TY EPDRL BBMI -B: Performed by: ANESTHESIOLOGY

## 2019-08-19 PROCEDURE — 85027 COMPLETE CBC AUTOMATED: CPT

## 2019-08-19 PROCEDURE — 74011250637 HC RX REV CODE- 250/637: Performed by: OBSTETRICS & GYNECOLOGY

## 2019-08-19 PROCEDURE — 75410000000 HC DELIVERY VAGINAL/SINGLE: Performed by: OBSTETRICS & GYNECOLOGY

## 2019-08-19 PROCEDURE — 3E0S3BZ INTRODUCTION OF ANESTHETIC AGENT INTO EPIDURAL SPACE, PERCUTANEOUS APPROACH: ICD-10-PCS | Performed by: ANESTHESIOLOGY

## 2019-08-19 PROCEDURE — 80053 COMPREHEN METABOLIC PANEL: CPT

## 2019-08-19 PROCEDURE — 75410000002 HC LABOR FEE PER 1 HR: Performed by: OBSTETRICS & GYNECOLOGY

## 2019-08-19 PROCEDURE — 74011000250 HC RX REV CODE- 250: Performed by: ANESTHESIOLOGY

## 2019-08-19 PROCEDURE — 86900 BLOOD TYPING SEROLOGIC ABO: CPT

## 2019-08-19 PROCEDURE — 99285 EMERGENCY DEPT VISIT HI MDM: CPT

## 2019-08-19 PROCEDURE — 74011250636 HC RX REV CODE- 250/636: Performed by: OBSTETRICS & GYNECOLOGY

## 2019-08-19 PROCEDURE — 84156 ASSAY OF PROTEIN URINE: CPT

## 2019-08-19 PROCEDURE — 99218 HC RM OBSERVATION: CPT

## 2019-08-19 PROCEDURE — 75410000003 HC RECOV DEL/VAG/CSECN EA 0.5 HR: Performed by: OBSTETRICS & GYNECOLOGY

## 2019-08-19 PROCEDURE — 59025 FETAL NON-STRESS TEST: CPT

## 2019-08-19 PROCEDURE — 36415 COLL VENOUS BLD VENIPUNCTURE: CPT

## 2019-08-19 RX ORDER — ACETAMINOPHEN 325 MG/1
650 TABLET ORAL
Status: DISCONTINUED | OUTPATIENT
Start: 2019-08-19 | End: 2019-08-20

## 2019-08-19 RX ORDER — NALBUPHINE HYDROCHLORIDE 10 MG/ML
10 INJECTION, SOLUTION INTRAMUSCULAR; INTRAVENOUS; SUBCUTANEOUS
Status: DISCONTINUED | OUTPATIENT
Start: 2019-08-19 | End: 2019-08-20

## 2019-08-19 RX ORDER — NALBUPHINE HYDROCHLORIDE 10 MG/ML
2.5 INJECTION, SOLUTION INTRAMUSCULAR; INTRAVENOUS; SUBCUTANEOUS
Status: ACTIVE | OUTPATIENT
Start: 2019-08-20 | End: 2019-08-20

## 2019-08-19 RX ORDER — IBUPROFEN 200 MG
1 TABLET ORAL DAILY
Status: DISCONTINUED | OUTPATIENT
Start: 2019-08-20 | End: 2019-08-22 | Stop reason: HOSPADM

## 2019-08-19 RX ORDER — FENTANYL/BUPIVACAINE/NS/PF 2-1250MCG
1-16 PREFILLED PUMP RESERVOIR EPIDURAL CONTINUOUS
Status: DISCONTINUED | OUTPATIENT
Start: 2019-08-20 | End: 2019-08-20

## 2019-08-19 RX ORDER — ONDANSETRON 2 MG/ML
4 INJECTION INTRAMUSCULAR; INTRAVENOUS
Status: DISCONTINUED | OUTPATIENT
Start: 2019-08-19 | End: 2019-08-20

## 2019-08-19 RX ORDER — SODIUM CHLORIDE 0.9 % (FLUSH) 0.9 %
5-40 SYRINGE (ML) INJECTION EVERY 8 HOURS
Status: DISCONTINUED | OUTPATIENT
Start: 2019-08-19 | End: 2019-08-20

## 2019-08-19 RX ORDER — NALOXONE HYDROCHLORIDE 0.4 MG/ML
0.4 INJECTION, SOLUTION INTRAMUSCULAR; INTRAVENOUS; SUBCUTANEOUS AS NEEDED
Status: DISCONTINUED | OUTPATIENT
Start: 2019-08-19 | End: 2019-08-20

## 2019-08-19 RX ORDER — SODIUM CHLORIDE, SODIUM LACTATE, POTASSIUM CHLORIDE, CALCIUM CHLORIDE 600; 310; 30; 20 MG/100ML; MG/100ML; MG/100ML; MG/100ML
125 INJECTION, SOLUTION INTRAVENOUS CONTINUOUS
Status: DISCONTINUED | OUTPATIENT
Start: 2019-08-19 | End: 2019-08-20

## 2019-08-19 RX ORDER — OXYTOCIN/RINGER'S LACTATE 20/1000 ML
0-25 PLASTIC BAG, INJECTION (ML) INTRAVENOUS
Status: DISCONTINUED | OUTPATIENT
Start: 2019-08-20 | End: 2019-08-20

## 2019-08-19 RX ORDER — SODIUM CHLORIDE 0.9 % (FLUSH) 0.9 %
5-40 SYRINGE (ML) INJECTION AS NEEDED
Status: DISCONTINUED | OUTPATIENT
Start: 2019-08-19 | End: 2019-08-22 | Stop reason: HOSPADM

## 2019-08-19 RX ORDER — IBUPROFEN 200 MG
1 TABLET ORAL DAILY
Status: DISCONTINUED | OUTPATIENT
Start: 2019-08-20 | End: 2019-08-19

## 2019-08-19 RX ADMIN — BUPIVACAINE HYDROCHLORIDE 1 ML: 2.5 INJECTION, SOLUTION EPIDURAL; INFILTRATION; INTRACAUDAL; PERINEURAL at 23:46

## 2019-08-19 RX ADMIN — LIDOCAINE HYDROCHLORIDE AND EPINEPHRINE 4 ML: 15; 5 INJECTION, SOLUTION EPIDURAL at 23:51

## 2019-08-19 RX ADMIN — SODIUM CHLORIDE, SODIUM LACTATE, POTASSIUM CHLORIDE, AND CALCIUM CHLORIDE 125 ML/HR: 600; 310; 30; 20 INJECTION, SOLUTION INTRAVENOUS at 21:49

## 2019-08-19 RX ADMIN — SODIUM CHLORIDE, SODIUM LACTATE, POTASSIUM CHLORIDE, AND CALCIUM CHLORIDE 999 ML/HR: 600; 310; 30; 20 INJECTION, SOLUTION INTRAVENOUS at 23:30

## 2019-08-19 NOTE — H&P
Labor and Delivery Admission Note  2019    26 y.o., , female, G3 P 2002 Estimated Date of Delivery: 19 by dates and US presents with contractions since 1500. She denies LOF, VB, and has good FM. She arrives to  and D at 1850  Reports no HA, no visual changes, and no epigastric pain. PNC: Blood type: AB            RH: pos            Rubella: immune            SVII serology: neg             GBS status: neg    POB:   X2 at term    PGYN:  HSV +, on valtrex    Past Medical History:   Diagnosis Date    Abnormal Pap smear of cervix 2019    LGSIL -> OB Colpo nohelia'd for 3/12/19    Allergic to cats     Anemia     taking PO iron    Anxiety and depression     managed by PCP    Genital herpes     taking valtrex    GERD (gastroesophageal reflux disease)     Nexplanon removal 07/10/2017    Was inserted on 16    Nicotine vapor product user     Postpartum depression     Screening for malignant neoplasm of the cervix 16    Negative (no hpv)    Stress     Susceptible to varicella (non-immune), currently pregnant 2016    vaccinate PP     Past Surgical History:   Procedure Laterality Date    HX APPENDECTOMY  2017    HX COLPOSCOPY  2019    HX WISDOM TEETH EXTRACTION  2012    Also gum graph     OB/GYN: Marilou Cruz    Meds:   No current facility-administered medications for this encounter. Allergies:    Allergies   Allergen Reactions    Sulfa (Sulfonamide Antibiotics) Nausea and Vomiting     Pertinent ROS: see HPI, otherwise noncontributory       Family History   Problem Relation Age of Onset    Heart Attack Mother     Lupus Mother     Heart Attack Father         x 2    Heart Disease Father     Hypertension Father      Social History     Socioeconomic History    Marital status: SINGLE     Spouse name: Not on file    Number of children: Not on file    Years of education: Not on file    Highest education level: Not on file   Occupational History    Not on file   Social Needs    Financial resource strain: Not on file    Food insecurity:     Worry: Not on file     Inability: Not on file    Transportation needs:     Medical: Not on file     Non-medical: Not on file   Tobacco Use    Smoking status: Current Every Day Smoker     Packs/day: 0.25     Years: 11.00     Pack years: 2.75     Types: Cigarettes    Smokeless tobacco: Never Used    Tobacco comment: Never used vapor or e-cigs    Substance and Sexual Activity    Alcohol use: No     Alcohol/week: 0.0 standard drinks    Drug use: No    Sexual activity: Yes     Partners: Male   Lifestyle    Physical activity:     Days per week: Not on file     Minutes per session: Not on file    Stress: Not on file   Relationships    Social connections:     Talks on phone: Not on file     Gets together: Not on file     Attends [employer] service: Not on file     Active member of club or organization: Not on file     Attends meetings of clubs or organizations: Not on file     Relationship status: Not on file    Intimate partner violence:     Fear of current or ex partner: Not on file     Emotionally abused: Not on file     Physically abused: Not on file     Forced sexual activity: Not on file   Other Topics Concern     Service Not Asked    Blood Transfusions Not Asked    Caffeine Concern Not Asked    Occupational Exposure Not Asked   Cathy Puller Hazards Not Asked    Sleep Concern Not Asked    Stress Concern Not Asked    Weight Concern Not Asked    Special Diet Not Asked    Back Care Not Asked    Exercise Not Asked    Bike Helmet Not Asked   [address] Not Asked    Self-Exams Not Asked   Social History Narrative    Not on file       OBJECTIVE:  Gravid , female NAD  Temp (24hrs), Av °F (36.7 °C), Min:98 °F (36.7 °C), Max:98 °F (36.7 °C)    Visit Vitals  /90   Pulse (!) 101   Temp 98 °F (36.7 °C)   Resp 14   LMP 2018       Labs:    Lab Results   Component Value Date/Time    WBC 18.8 (H) 07/30/2019 01:23 PM       Exam:  HEENT:  normal   Lungs:  clear  Cor:  RRR  Abdomen:  Fundal height c/w GA                    Soft between UC                    Clinical EFW 7#  Fetal heart rate tracing:  Cat I, reactive  Contraction pattern: Q4  Cervix:  3/50/posterior, unchanged from office  Fluid:  intact  Pelvimetry:  AP-good                      Arch- adequate                      Sidewalls- adequate                      Pelvis feels adequate for fetus.     Impression:  IUP at 39 6/7 weeks with contractions, GBS negative    Plan:ambulate, re-evaluate for labor    Liliana Najera MD

## 2019-08-19 NOTE — PROGRESS NOTES
1900: Pt states she feeling cramping that began around 1500. Pt states contractions are irregular but feel really intense. Pt denies any bleeding, states she feels baby moving and denies any complications with her pregnancy. 1903: NST started at this time. 1925: Dr. Tian Dow at pt bedside for assessment and evaluation. Per MD pt is unchanged from previous exam 3cm/50 effaced. MD states pt should ambulate and we will recheck cervix in 1 to 2 hrs. Pt verbalized understanding. 1937: This RN updating Dr. Tian Dow on repeat BP reading. MD SHERIDAN draw labs and obtain PCR ratio. No further orders at this time. 1953: Per MD, pt should hold off on ambulating until labs results are back. 1955: This RN discussing plan of care with pt. Pt verbalizing understanding. 2105: This RN calling Dr. Tian Dow with lab results. Per MD pt will be staying, due to mild pre-e with a PCR of 0.4. Pt should relax in bed to keep blood pressures in check. MD TORB to recheck pt's cervix around midnight and if pt remains unchanged, pitocin may need to be started around 0200. No further orders at this time. 2300: This RN updating MD on pt status. MD aware that pt is being bolused for an epidural and that pt is requesting nicotine patch. MD VORB nicotine patch, recheck cervix and start pitocin appropriately based on contraction pattern    2308: SVE per MD 4-5/60/-2. MD discussing POC. Pt verbalizing understanding. 2337: This RN calling Dr. Lena Link for epidural placement. MD states he will be up shortly. 2340: Dr. Lena Link at pt bedside for epidural placement. MD discussing POC. Pt verbalized understanding. 2343: Time out taken for epidural    0045: Melton placed at this time. 7799: This RN calling Dr. Lena Link to come reassess epidural, due to the pump saying occlusion, which will not resolve. MD stating he will come to pt bedside.      0110: Dr. Lena Link at pt bedside to reassess epidural placement. 0150: This RN discussing pt status with MD.  Per MD recheck cervix at 0400 and assess the need to start pitocin at that time. 4520: SVE per this RN 5/60/-2.    0400: This RN updating Dr. Maryan Escalante on pt status, cervix unchanged. Per MD, ok to start pitocin 30mg/500mL at this time. No further orders at this time. 2569: Pitocin started at this time. 0542: Pt states she isn't getting relief from her epidural and the bolus button hasn't helped. This RN calling Dr. Dorys Fontana to assess pt.      0521: Dr. Dorys Fontana at pt bedside to redose pt's epidural    0710: Bedside and Verbal shift change report given to APARNA Chester RN (oncoming nurse) by Jes Lopez RN (offgoing nurse). Report included the following information SBAR, Kardex, Procedure Summary, Intake/Output, MAR, Accordion and Recent Results.

## 2019-08-20 PROCEDURE — 77030034850

## 2019-08-20 PROCEDURE — 74011250637 HC RX REV CODE- 250/637: Performed by: OBSTETRICS & GYNECOLOGY

## 2019-08-20 PROCEDURE — 74011250636 HC RX REV CODE- 250/636: Performed by: OBSTETRICS & GYNECOLOGY

## 2019-08-20 PROCEDURE — 74011000250 HC RX REV CODE- 250: Performed by: ANESTHESIOLOGY

## 2019-08-20 PROCEDURE — 88307 TISSUE EXAM BY PATHOLOGIST: CPT

## 2019-08-20 PROCEDURE — 10D17Z9 MANUAL EXTRACTION OF PRODUCTS OF CONCEPTION, RETAINED, VIA NATURAL OR ARTIFICIAL OPENING: ICD-10-PCS | Performed by: OBSTETRICS & GYNECOLOGY

## 2019-08-20 PROCEDURE — 65270000029 HC RM PRIVATE

## 2019-08-20 PROCEDURE — 75410000002 HC LABOR FEE PER 1 HR: Performed by: OBSTETRICS & GYNECOLOGY

## 2019-08-20 RX ORDER — CEFAZOLIN SODIUM/WATER 2 G/20 ML
2 SYRINGE (ML) INTRAVENOUS ONCE
Status: COMPLETED | OUTPATIENT
Start: 2019-08-20 | End: 2019-08-20

## 2019-08-20 RX ORDER — HYDROCORTISONE ACETATE PRAMOXINE HCL 2.5; 1 G/100G; G/100G
CREAM TOPICAL AS NEEDED
Status: DISCONTINUED | OUTPATIENT
Start: 2019-08-20 | End: 2019-08-22 | Stop reason: HOSPADM

## 2019-08-20 RX ORDER — IBUPROFEN 800 MG/1
800 TABLET ORAL
Status: DISCONTINUED | OUTPATIENT
Start: 2019-08-20 | End: 2019-08-22 | Stop reason: HOSPADM

## 2019-08-20 RX ORDER — OXYTOCIN/0.9 % SODIUM CHLORIDE 30/500 ML
0-25 PLASTIC BAG, INJECTION (ML) INTRAVENOUS
Status: DISCONTINUED | OUTPATIENT
Start: 2019-08-20 | End: 2019-08-20

## 2019-08-20 RX ORDER — DOCUSATE SODIUM 100 MG/1
100 CAPSULE, LIQUID FILLED ORAL
Status: DISCONTINUED | OUTPATIENT
Start: 2019-08-20 | End: 2019-08-22 | Stop reason: HOSPADM

## 2019-08-20 RX ORDER — PROMETHAZINE HYDROCHLORIDE 25 MG/1
25 TABLET ORAL
Status: DISCONTINUED | OUTPATIENT
Start: 2019-08-20 | End: 2019-08-22 | Stop reason: HOSPADM

## 2019-08-20 RX ORDER — NALOXONE HYDROCHLORIDE 0.4 MG/ML
0.4 INJECTION, SOLUTION INTRAMUSCULAR; INTRAVENOUS; SUBCUTANEOUS AS NEEDED
Status: DISCONTINUED | OUTPATIENT
Start: 2019-08-20 | End: 2019-08-22 | Stop reason: HOSPADM

## 2019-08-20 RX ORDER — SIMETHICONE 80 MG
80 TABLET,CHEWABLE ORAL
Status: DISCONTINUED | OUTPATIENT
Start: 2019-08-20 | End: 2019-08-22 | Stop reason: HOSPADM

## 2019-08-20 RX ORDER — HYDROCODONE BITARTRATE AND ACETAMINOPHEN 5; 325 MG/1; MG/1
1 TABLET ORAL
Status: DISCONTINUED | OUTPATIENT
Start: 2019-08-20 | End: 2019-08-22 | Stop reason: HOSPADM

## 2019-08-20 RX ORDER — BUPIVACAINE HYDROCHLORIDE 2.5 MG/ML
INJECTION, SOLUTION EPIDURAL; INFILTRATION; INTRACAUDAL AS NEEDED
Status: DISCONTINUED | OUTPATIENT
Start: 2019-08-19 | End: 2019-08-21 | Stop reason: HOSPADM

## 2019-08-20 RX ORDER — OXYTOCIN/RINGER'S LACTATE 20/1000 ML
999 PLASTIC BAG, INJECTION (ML) INTRAVENOUS ONCE
Status: ACTIVE | OUTPATIENT
Start: 2019-08-20 | End: 2019-08-20

## 2019-08-20 RX ORDER — LIDOCAINE HYDROCHLORIDE AND EPINEPHRINE 15; 5 MG/ML; UG/ML
INJECTION, SOLUTION EPIDURAL AS NEEDED
Status: DISCONTINUED | OUTPATIENT
Start: 2019-08-19 | End: 2019-08-21 | Stop reason: HOSPADM

## 2019-08-20 RX ADMIN — SODIUM CHLORIDE, SODIUM LACTATE, POTASSIUM CHLORIDE, AND CALCIUM CHLORIDE 125 ML/HR: 600; 310; 30; 20 INJECTION, SOLUTION INTRAVENOUS at 05:55

## 2019-08-20 RX ADMIN — OXYTOCIN-SODIUM CHLORIDE 0.9% IV SOLN 30 UNIT/500ML 2 MILLI-UNITS/MIN: 30-0.9/5 SOLUTION at 04:17

## 2019-08-20 RX ADMIN — OXYTOCIN-SODIUM CHLORIDE 0.9% IV SOLN 30 UNIT/500ML 999 MILLI-UNITS/MIN: 30-0.9/5 SOLUTION at 08:25

## 2019-08-20 RX ADMIN — BENZOCAINE AND MENTHOL 1 LOZENGE: 15; 3.6 LOZENGE ORAL at 23:23

## 2019-08-20 RX ADMIN — Medication 12 ML/HR: at 00:18

## 2019-08-20 RX ADMIN — Medication 12 ML/HR: at 06:22

## 2019-08-20 RX ADMIN — IBUPROFEN 800 MG: 800 TABLET ORAL at 10:39

## 2019-08-20 RX ADMIN — Medication 2 G: at 10:39

## 2019-08-20 RX ADMIN — HYDROCODONE BITARTRATE AND ACETAMINOPHEN 1 TABLET: 5; 325 TABLET ORAL at 21:30

## 2019-08-20 RX ADMIN — BENZOCAINE AND MENTHOL 1 LOZENGE: 15; 3.6 LOZENGE ORAL at 09:08

## 2019-08-20 RX ADMIN — BUPIVACAINE HYDROCHLORIDE 5 ML: 2.5 INJECTION, SOLUTION EPIDURAL; INFILTRATION; INTRACAUDAL; PERINEURAL at 05:47

## 2019-08-20 RX ADMIN — BENZOCAINE AND MENTHOL 1 LOZENGE: 15; 3.6 LOZENGE ORAL at 05:34

## 2019-08-20 RX ADMIN — OXYTOCIN-SODIUM CHLORIDE 0.9% IV SOLN 30 UNIT/500ML 999 MILLI-UNITS/MIN: 30-0.9/5 SOLUTION at 08:56

## 2019-08-20 RX ADMIN — IBUPROFEN 800 MG: 800 TABLET ORAL at 19:12

## 2019-08-20 RX ADMIN — HYDROCODONE BITARTRATE AND ACETAMINOPHEN 1 TABLET: 5; 325 TABLET ORAL at 17:40

## 2019-08-20 RX ADMIN — HYDROCODONE BITARTRATE AND ACETAMINOPHEN 1 TABLET: 5; 325 TABLET ORAL at 13:15

## 2019-08-20 RX ADMIN — BENZOCAINE AND MENTHOL 1 LOZENGE: 15; 3.6 LOZENGE ORAL at 02:06

## 2019-08-20 RX ADMIN — BUPIVACAINE HYDROCHLORIDE 5 ML: 2.5 INJECTION, SOLUTION EPIDURAL; INFILTRATION; INTRACAUDAL; PERINEURAL at 01:13

## 2019-08-20 RX ADMIN — BENZOCAINE AND MENTHOL 1 LOZENGE: 15; 3.6 LOZENGE ORAL at 18:02

## 2019-08-20 NOTE — ANESTHESIA PROCEDURE NOTES
CSE Block    Start time: 8/19/2019 11:35 PM  End time: 8/20/2019 12:00 AM  Performed by: Barry Ratliff MD  Authorized by: Barry Ratliff MD     Pre-Procedure  Indications: procedure for pain    preanesthetic checklist: patient identified, risks and benefits discussed, anesthesia consent, site marked, patient being monitored and timeout performed    Timeout Time: 23:36        Procedure:   Patient Position:  Seated  Prep Region:  Lumbar  Prep: patient draped and chlorhexidine    Location:  L2-3    Epidural Needle:   Needle Type:  Tuohy  Needle Gauge:  17 G  Injection Technique:  Loss of resistance using air  Attempts:  1    Spinal Needle:   Needle Type:  Pencil-tip  Needle Gauge:  25 G    Catheter:   Catheter Type:  Standard  Catheter at Skin Depth (cm):  9  Depth in Epidural Space (cm):  5  Events: no blood with aspiration, no paresthesia, negative aspiration test and CSF confirmed      Assessment:   Catheter Secured:  Tegaderm and tape  Insertion:  Uncomplicated  Patient tolerance:  Patient tolerated the procedure well with no immediate complications

## 2019-08-20 NOTE — LACTATION NOTE
This note was copied from a baby's chart. This is mother's third baby. She attempted breastfeeding with her 2 older children. She plans to breast and formula feed her new baby. Discussed offering baby breast milk first so that baby gets mother's antibodies. Discussed with mother her plan for feeding. Reviewed the benefits of exclusive breast milk feeding during the hospital stay. Informed her of the risks of using formula to supplement in the first few days of life as well as the benefits of successful breast milk feeding; referred her to the Breastfeeding booklet about this information. She acknowledges understanding of information reviewed and states that it is her plan to breastfeed her infant. Will support her choice and offer additional information as needed. Encouraged mom to attempt feeding with baby led feeding cues. Just as sucking on fingers, rooting, mouthing. Looking for 8-12 feedings in 24 hours. Don't limit baby at breast, allow baby to come of breast on it's own. Baby may want to feed  often and may increase number of feedings on second day of life. Skin to skin encouraged. If baby doesn't nurse,  Mom should  hand express  10-20 drops of colostrum and drip into baby's mouth, or give to baby by finger feeding, cup feeding, or spoon feeding at least every 2-3 hours. Hand Expression Education:  Mom taught how to manually hand express her colostrum. Emphasized the importance of providing infant with valuable colostrum as infant rests skin to skin at breast.  Aware to avoid extended periods of non-feeding. Aware to offer 10-20+ drops of colostrum every 2-3 hours until infant is latching and nursing effectively. Taught the rationale behind this low tech but highly effective evidence based practice. MOther  will successfully establish breastfeeding by feeding in response to early feeding cues   or wake every 3h, will obtain deep latch, and will keep log of feedings/output. Taught to BF at hunger cues and or q 2-3 hrs and to offer 10-20 drops of hand expressed colostrum at any non-feeds. Breast Assessment  Left Breast: Medium  Left Nipple: Everted, Intact  Right Breast: Medium  Right Nipple: Everted, Intact  Breast- Feeding Assessment  Attends Breast-Feeding Classes: No  Breast-Feeding Experience: No(Attempted with 2 older children)  Breast Trauma/Surgery: No  Type/Quality: Good(Mother plans to breast and formula feed baby - instructed her to offer baby breast first so that baby gets mothers antibodies)  Lactation Consultant Visits  Breast-Feedings: Good (LC saw baby's first feeding.  Baby was latched on well to left breast and nursing well. )  Mother/Infant Observation  Mother Observation: Alignment, Recognizes feeding cues, Breast comfortable, Holds breast, Close hold, Lets baby end feeding, Nipple round on release  Infant Observation: Audible swallows, Lips flanged, lower, Lips flanged, upper, Opens mouth, Latches nipple and aereolae, Rhythmic suck  LATCH Documentation  Latch: Grasps breast, tongue down, lips flanged, rhythmic sucking  Audible Swallowing: A few with stimulation  Type of Nipple: Everted (after stimulation)  Comfort (Breast/Nipple): Soft/non-tender  Hold (Positioning): No assist from staff, mother able to position/hold infant  LATCH Score: 9

## 2019-08-20 NOTE — PROGRESS NOTES
Pt with BPs in 130/ 90s since admission. Denies HA, visual changes. Labs normal except p/c ratio of .4. Will admit and augment for pre-eclampsia without severe features.

## 2019-08-20 NOTE — PROGRESS NOTES
08/20/19 11:03 AM  CM met with HOSSEIN and her boyfriend/FOB Emeka Willis ([phone]) to complete initial assessment and to begin discharge planning. Demographics were reviewed and confirmed; HOSSEIN and FOB live together in [place], [place] with HOSSEIN's older two children, ages 3 and 11. HOSSEIN does not work outside the home, FOB works and will have 1 week off from work. Dr. Matthias Choe at [facility] [place] will provide medical follow up for the baby. Patient has car seat, crib, clothing, and other necessary supplies. HOSSEIN is breastfeeding and has a pump to use at home as needed. HOSSEIN [place] [address] [place] [facility] and also [place] services; she knows to call to add baby to both services. HOSSEIN denied any additional needs at this time. Briefly discussed HOSSEIN's history of PPD, provided education to FOB and family on signs and symptoms, and resources for help if needed. Care Management Interventions  PCP Verified by CM:  Yes  Mode of Transport at Discharge: Self  Transition of Care Consult (CM Consult): Discharge Planning  Current Support Network: Family Lives [place], Other(Lives with boyfriend/FOB)  Confirm Follow Up Transport: Family  Plan discussed with Pt/Family/Caregiver: Yes  Freedom of Choice Offered: Yes  Discharge Location  Discharge Placement: Home with family assistance  NEVILLE Pineda

## 2019-08-20 NOTE — PROGRESS NOTES
Pt feeling more uncomfortable      AVSS, /71  FHT: Cat I  [place]: Q2-3  SVE: 4-5/60/-2 posterior      A/ labor in setting of pre-eclampsia without severe features, GBS neg + HSV, no lesions on valtrex    P/ epidural, expect

## 2019-08-20 NOTE — ANESTHESIA PREPROCEDURE EVALUATION
Relevant Problems   No relevant active problems       Anesthetic History   No history of anesthetic complications            Review of Systems / Medical History  Patient summary reviewed and pertinent labs reviewed    Pulmonary  Within defined limits                 Neuro/Psych         Psychiatric history (anxiety / depression)     Cardiovascular    Hypertension: well controlled              Exercise tolerance: >4 METS     GI/Hepatic/Renal     GERD: well controlled           Endo/Other  Within defined limits           Other Findings              Physical Exam    Airway  Mallampati: II  TM Distance: 4 - 6 cm  Neck ROM: normal range of motion   Mouth opening: Normal     Cardiovascular    Rhythm: regular  Rate: normal         Dental  No notable dental hx       Pulmonary  Breath sounds clear to auscultation               Abdominal  GI exam deferred       Other Findings            Anesthetic Plan    ASA: 2  Anesthesia type: CSE          Induction: Intravenous  Anesthetic plan and risks discussed with: Patient

## 2019-08-20 NOTE — L&D DELIVERY NOTE
Delivery Summary    Patient: Milagros Killian MRN: [id number]  SSN: [id number]    YOB: 1990  Age: 26 y.o. Sex: female       Information for the patient's :  Ashtyn Kiser [[id number]]       Labor Events:    Labor: No    Steroids: None   Cervical Ripening Date/Time:       Cervical Ripening Type: None   Antibiotics During Labor: No   Rupture Identifier:      Rupture Date/Time: 2019 6:55 AM   Rupture Type: SROM   Amniotic Fluid Volume: Moderate    Amniotic Fluid Description: Clear    Amniotic Fluid Odor: None    Induction: Oxytocin       Induction Date/Time:        Indications for Induction:      Augmentation: Oxytocin   Augmentation Date/Time: 52:09 AM   Indications for Augmentation: Preeclampsia   Labor complications: None       Additional complications:        Delivery Events:  Indications For Episiotomy:     Episiotomy: None   Perineal Laceration(s): None   Repaired:     Periurethral Laceration Location:      Repaired:     Labial Laceration Location:     Repaired:     Sulcal Laceration Location:     Repaired:     Vaginal Laceration Location:     Repaired:     Cervical Laceration Location:     Repaired:     Repair Suture: None   Number of Repair Packets:     Estimated Blood Loss (ml):  ml     Delivery Date: 2019    Delivery Time: 8:23 AM  Delivery Type: Vaginal, Spontaneous  Sex:  Female    Gestational Age: 37w0d   Delivery Clinician:  Tomas Gama  Living Status: Living   Delivery Location: L&D 205          APGARS  One minute Five minutes Ten minutes   Skin color: 1   1        Heart rate: 2   2        Grimace: 2   2        Muscle tone: 2   2        Breathin   2        Totals: 9   9            Presentation: Vertex    Position: Left Occiput Anterior  Resuscitation Method:  Suctioning-bulb; Tactile Stimulation     Meconium Stained: None      Cord Information: 3 Vessels  Complications: Knot  Cord around:    Delayed cord clamping?  Yes  Cord clamped date/time:2019  8:24 AM  Disposition of Cord Blood: Discard    Blood Gases Sent?: No    Placenta:  Date/Time: 2019  8:51 AM  Removal: Manual Removal      Appearance: Normal;Intact      Measurements:  Birth Weight: 8 lb 0.8 oz (3.65 kg)      Birth Length: 1' 7\" (0.483 m)      Head Circumference: 1' 0.6\" (0.32 m)      Chest Circumference: 1' 1.39\" (0.34 m)     Abdominal Girth: 1' 0.4\" (0.315 m)    Other Providers:   Matthias RAMSEY;Anitra YOUNG KIMBERLY;Beltran ALFARO, Obstetrician;Primary Nurse;Primary [place] Nurse;Charge Nurse;Tech           Group B Strep:   Lab Results   Component Value Date/Time    Potrep, External Negative 2019     Information for the patient's :  Anne Heck [[id number]]   No results found for: ABORH, PCTABR, PCTDIG, BILI, ABORHEXT, ABORH    No results for input(s): PCO2CB, PO2CB, HCO3I, SO2I, IBD, PTEMPI, SPECTI, PHICB, ISITE, IDEV, IALLEN in the last 72 hours.  living female. MANUEL. Left shoulder anterior. Shoulder slow to deliver but not impacted, not true shoulder dystocia. True knot noted in cord. Placenta manually removed after 30min. Uterus swept twice more to confirm clean cavity. Initially some mild atony that responded to fundal massage and pitocin to IVF. Perineum intact. EBL=350cc  Will given Ancef 2gm for prophylaxis given uterine manipulation.

## 2019-08-20 NOTE — ROUTINE PROCESS
TRANSFER - IN REPORT:    Verbal report received from Jeane Pennington RN(name) on Shea Marquez  being received from L&D(unit) for routine progression of care      Report consisted of patients Situation, Background, Assessment and   Recommendations(SBAR). Information from the following report(s) SBAR was reviewed with the receiving nurse. Opportunity for questions and clarification was provided. Assessment completed upon patients arrival to unit and care assumed.

## 2019-08-20 NOTE — PROGRESS NOTES
0710 Verbal and bedside report received from Laura Arguello RN. Patient care assumed at this time. 0730 Morning assessment completed at this time. [address] patient to right lateral.     [id number] Dr. Sachi Peng at bedside. SVE 10/100/+3. Will begin pushing. 5007 Patient placed in stirrups and begin pushing. 7853 Delivery of vigorously crying female infant at this time by MD. After delayed cord clamping, cord clamped and cut by MD.     8957 Begin pitocin bolus for postpartum bleeding. 0050 Placenta not yet delivered. Awaiting delivery. MD at bedside. 7661 Manual removal of placenta by Eddi Wagner. Will do 2 g ancef per MD order. 80 QBL at delivery 475. Epidural discontinued at this time. Instructed patient not to get out of bed without assistance from RN and the need to measure first two voids after delivery. Patient states understanding. Call bell within reach. 8325 Patient doing well. Skin to skin with infant and breastfeeding. [id number] Patient doing well. Does not yet have to void. Underpads changed and mesh underwear applied. Epidural line removed. 1120 TRANSFER - OUT REPORT:    Verbal report given to SONU Gross RNv (name) on Hector Guerrero  being transferred to MIU (unit) for routine progression of care       Report consisted of patients Situation, Background, Assessment and   Recommendations(SBAR). Information from the following report(s) SBAR, Kardex, Intake/Output, MAR, Accordion, Recent Results and Med Rec Status was reviewed with the receiving nurse. Lines:   Peripheral IV 08/19/19 Right Hand (Active)   Site Assessment Clean, dry, & intact 8/20/2019  7:30 AM   Phlebitis Assessment 0 8/20/2019  7:30 AM   Infiltration Assessment 0 8/20/2019  7:30 AM   Dressing Status Clean, dry, & intact 8/20/2019  7:30 AM   Dressing Type Tape;Transparent 8/20/2019  7:30 AM   Hub Color/Line Status Pink; Infusing;Patent 8/20/2019  7:30 AM        Opportunity for questions and clarification was provided. Patient transported with:   Registered Nurse     Baby bands verified with RN and patient.

## 2019-08-20 NOTE — ROUTINE PROCESS
Bedside and Verbal shift change report given to Jeremias Little RN (oncoming nurse) by Nilo Ortega RN (offgoing nurse). Report included the following information SBAR.

## 2019-08-21 PROCEDURE — 65270000029 HC RM PRIVATE

## 2019-08-21 PROCEDURE — 74011250637 HC RX REV CODE- 250/637: Performed by: OBSTETRICS & GYNECOLOGY

## 2019-08-21 RX ADMIN — IBUPROFEN 800 MG: 800 TABLET ORAL at 11:32

## 2019-08-21 RX ADMIN — BENZOCAINE AND MENTHOL 1 LOZENGE: 15; 3.6 LOZENGE ORAL at 23:19

## 2019-08-21 RX ADMIN — HYDROCODONE BITARTRATE AND ACETAMINOPHEN 1 TABLET: 5; 325 TABLET ORAL at 23:19

## 2019-08-21 RX ADMIN — HYDROCODONE BITARTRATE AND ACETAMINOPHEN 1 TABLET: 5; 325 TABLET ORAL at 06:26

## 2019-08-21 RX ADMIN — HYDROCODONE BITARTRATE AND ACETAMINOPHEN 1 TABLET: 5; 325 TABLET ORAL at 02:10

## 2019-08-21 RX ADMIN — HYDROCODONE BITARTRATE AND ACETAMINOPHEN 1 TABLET: 5; 325 TABLET ORAL at 14:31

## 2019-08-21 RX ADMIN — BENZOCAINE AND MENTHOL 1 LOZENGE: 15; 3.6 LOZENGE ORAL at 18:35

## 2019-08-21 RX ADMIN — HYDROCODONE BITARTRATE AND ACETAMINOPHEN 1 TABLET: 5; 325 TABLET ORAL at 10:03

## 2019-08-21 RX ADMIN — BENZOCAINE AND MENTHOL 1 LOZENGE: 15; 3.6 LOZENGE ORAL at 06:26

## 2019-08-21 RX ADMIN — HYDROCODONE BITARTRATE AND ACETAMINOPHEN 1 TABLET: 5; 325 TABLET ORAL at 18:34

## 2019-08-21 RX ADMIN — IBUPROFEN 800 MG: 800 TABLET ORAL at 19:31

## 2019-08-21 RX ADMIN — BENZOCAINE AND MENTHOL 1 LOZENGE: 15; 3.6 LOZENGE ORAL at 11:32

## 2019-08-21 RX ADMIN — DOCUSATE SODIUM 100 MG: 100 CAPSULE, LIQUID FILLED ORAL at 10:03

## 2019-08-21 RX ADMIN — IBUPROFEN 800 MG: 800 TABLET ORAL at 03:40

## 2019-08-21 RX ADMIN — MUPIROCIN: 20 OINTMENT TOPICAL at 11:32

## 2019-08-21 NOTE — PROGRESS NOTES
Bedside and Verbal shift change report given to Bronwyn Haro RN (oncoming nurse) by ANNABELLE Zepeda RN (offgoing nurse). Report included the following information SBAR, Kardex, Intake/Output and MAR.

## 2019-08-21 NOTE — LACTATION NOTE
This note was copied from a baby's chart. Mother states baby has been nursing frequently. Mother has nipple tenderness. Baby had her frenulum clipped this afternoon. Instructed mother to call [facility] for breastfeeding assistance. LC able to discuss the following:    Reviewed breastfeeding basics:  Supply and demand, breastfeed baby 8-12 times in 24 hr,.feed baby on demand,  inewborn stomach size, early  Feeding cues, skin to skin, positioning and baby led latch-on, assymetrical latch with signs of good, deep latch vs shallow, feeding frequency and duration, and log sheet for tracking infant feedings and output. Breastfeeding Booklet and Warm line information given. Discussed typical  weight loss and the importance of infant weight checks with pediatrician 1-2 post discharge. Care for sore/tender nipples discussed:  ways to improve positioning and latch practiced and discussed, hand express colostrum after feedings and let air dry, light application of lanolin, hydrogel pads, seek comfortable laid back feeding position, start feedings on least sore side first.    Pt will successfully establish breastfeeding by feeding in response to early feeding cues   or wake every 3h, will obtain deep latch, and will keep log of feedings/output. Taught to BF at hunger cues and or q 2-3 hrs and to offer 10-20 drops of hand expressed colostrum at any non-feeds. Breast Assessment  Left Breast: Medium(Milk is not in yet)  Left Nipple: Everted, Intact  Right Breast: Medium  Right Nipple: Everted, Intact  Breast- Feeding Assessment  Attends Breast-Feeding Classes: No  Breast-Feeding Experience: No(Attempted with2 older children)  Breast Trauma/Surgery: No  Type/Quality: Good(Mother states baby has been nursing frequentlly but she has nipple tenderndess. Baby had a tongue tie clipped today by Dr. Merissa Hope. Mother holding baby in bed and plans to breastfeed baby again within the hour. Instructed mom to call for feeding assistance)  Lactation Consultant Visits  Breast-Feedings: (Motherj last breast fed baby at 56 - baby nursed for 30 minutes.)    Instructed mother to call [facility] for breastfeeding assistance.

## 2019-08-21 NOTE — LACTATION NOTE
This note was copied from a baby's chart. Mother called LC. Baby was latched on well to right breast with a wide open mouth and lips flanged out. Baby had a vigorous suck and audible swallows heard. Mother states latch felt much more comfortable and was able to feel good pulls and tugs during feeding.

## 2019-08-21 NOTE — PROGRESS NOTES
Post-Partum Day Number 1 Progress Note      Patient doing well post-partum without significant complaint. She is voiding without difficulty, she reports normal lochia. She is ambulatiing without dizziness. Her pain is well controlled with oral pain medication. She is tolerating general diet. Breast feeding. Vitals:    Patient Vitals for the past 8 hrs:   BP Temp Pulse Resp   19 1000 136/82 98.2 °F (36.8 °C) 84 16   19 0519 135/81 98.4 °F (36.9 °C) 88 16     Temp (24hrs), Av.3 °F (36.8 °C), Min:98.1 °F (36.7 °C), Max:98.5 °F (36.9 °C)        Exam:  Patient without distress. Lungs: CTA bilaterally               CV: regular rate/rhythm, no rubs or gallops, no murmur               Abdomen soft, nontender, nondistended, normal bowel sounds               Uterus: fundus firm at level of umbilicus, nontender               Lower extremities are negative for cords or tenderness, tr swelling. Labs: No results found for this or any previous visit (from the past 24 hour(s)). Lab Results   Component Value Date/Time    Rubella, External Negative 2019    GrBStrep, External Negative 2019    HBsAg, External Negative 2019    HIV, External Negative 2019    RPR, External Non-Reactive 2013    Gonorrhea, External Negative 2019    Chlamydia, External Negative 2019       Assessment and Plan:   Postpartum Day #1 S/P . Doing well.    - routine care   - anticipate discharge in AM

## 2019-08-22 VITALS
HEIGHT: 61 IN | TEMPERATURE: 97.7 F | WEIGHT: 158 LBS | BODY MASS INDEX: 29.83 KG/M2 | HEART RATE: 95 BPM | OXYGEN SATURATION: 98 % | RESPIRATION RATE: 16 BRPM | SYSTOLIC BLOOD PRESSURE: 143 MMHG | DIASTOLIC BLOOD PRESSURE: 90 MMHG

## 2019-08-22 PROCEDURE — 74011250637 HC RX REV CODE- 250/637: Performed by: OBSTETRICS & GYNECOLOGY

## 2019-08-22 RX ADMIN — BENZOCAINE AND MENTHOL 1 LOZENGE: 15; 3.6 LOZENGE ORAL at 05:48

## 2019-08-22 RX ADMIN — HYDROCODONE BITARTRATE AND ACETAMINOPHEN 1 TABLET: 5; 325 TABLET ORAL at 03:12

## 2019-08-22 RX ADMIN — IBUPROFEN 800 MG: 800 TABLET ORAL at 03:12

## 2019-08-22 RX ADMIN — HYDROCODONE BITARTRATE AND ACETAMINOPHEN 1 TABLET: 5; 325 TABLET ORAL at 08:28

## 2019-08-22 NOTE — DISCHARGE INSTRUCTIONS
POST DELIVERY DISCHARGE INSTRUCTIONS    Name: Vivi Baez  YOB: 1990      General:     Diet/Diet Restrictions:  Eight 8-ounce glasses of fluid daily (primarily water); avoid excessive caffeine intake. Meals/snacks as desired which are high in fiber and complex carbohydrates and low in fat and cholesterol. Physical Activity / Restrictions / Safety:     Avoid heavy lifting, no more that 15 lbs. For 2-3 weeks; No driving while taking narcotic pain medication. Post  patients should not drive until pain free. No intercourse until seen for your 6 week postpartum visit, no douching or tampon use. No swimming or tub baths for 6 weeks. May resume exercise in 4-6 weeks. Discharge Instructions/Special Treatment/Home Care Needs:     Continue prenatal vitamins. Continue to use squirt bottle with warm water on your episiotomy after each bathroom use until bleeding stops. If steri-strips applied to your incision, remove in 14 days. Take stool softeners daily. Call your doctor for the following:     Fever over 101 degrees by mouth. Vaginal bleeding heavier than a normal menstrual period or lost larger than a golf ball. Red streaks or increased swelling of legs, painful red streaks on your breast.  Painful urination, or increased pain, redness or discharge with your incision. Pain Management:     Pain Management:   Take Acetaminophen (Tylenol) or Ibuprofen (Advil, Motrin), as directed for pain. Use a warm Sitz bath 3 times daily to relieve episiotomy or hemorrhoidal discomfort. Heating pad to  incision as needed. For hemorrhoidal discomfort, use Tucks and Anusol cream as needed and directed.     Follow-Up Care:     Appointment with MD:   Follow-up Appointments   Procedures    FOLLOW UP VISIT Appointment in: 6 Weeks     Standing Status:   Standing     Number of Occurrences:   1     Order Specific Question:   Appointment in     Answer:   6 Weeks     Telephone number: [phone]    Signed By: Miladys Pelayo MD                                                                                                   Date: 8/22/2019 Time: 9:46 AM

## 2019-08-22 NOTE — ROUTINE PROCESS
Reviewed discharge instructions with patient, she verbalized understanding. Documents signed and no prescriptions provided. Follow up with OB in 6 weeks.

## 2019-08-22 NOTE — DISCHARGE SUMMARY
Obstetrical Discharge Summary     Name: Nasreen Barkley MRN: [id number]  SSN: [id number]    YOB: 1990  Age: 26 y.o. Sex: female      Admit Date: 2019    Discharge Date: No discharge date for patient encounter. Admitting Physician: Isamar Leach MD     Attending Physician:  Michela Boyd MD     Admission Diagnoses: Pre-eclampsia [O14.90]    Procedures for this admission:     Discharge Diagnoses:   Information for the patient's :  Dee Dee Brady [[id number]]   Delivery of a 8 lb 0.8 oz (3.65 kg) female infant via Vaginal, Spontaneous on 2019 at 8:23 AM  by Starr Ma. Apgars were 9  and 9 . Discharge Condition: good    Discharge disposition: Home Nonsteroidals    Hospital Course: Normal hospital course following the delivery. Patient Instructions:   Current Discharge Medication List      CONTINUE these medications which have NOT CHANGED    Details   PNV No12-Iron-FA-DSS-OM-3 29 mg iron-1 mg -50 mg CPKD Take  by mouth. STOP taking these medications       valACYclovir (VALTREX) 500 mg tablet Comments:   Reason for Stopping:         IRON, FERROUS SULFATE, PO Comments:   Reason for Stopping:               Reference my discharge instructions.     Follow-up Appointments   Procedures    FOLLOW UP VISIT Appointment in: 6 Weeks     Standing Status:   Standing     Number of Occurrences:   1     Order Specific Question:   Appointment in     Answer:   6 Weeks        Signed By:  Starr Ma MD     2019

## 2019-08-22 NOTE — PROGRESS NOTES
Post-Partum Day Number 2 Progress/Discharge Note    Patient doing well post-partum without significant complaint. She is voiding without difficulty, she reports normal lochia. She is ambulatiing without dizziness. Her pain is well controlled with oral pain medication. She is tolerating general diet. Breast feeding going better since baby had tongue clipped yesterday. Vitals:    Patient Vitals for the past 8 hrs:   BP Temp Pulse Resp   19 0826 143/90 97.7 °F (36.5 °C) 95 16     Temp (24hrs), Av °F (36.7 °C), Min:97.7 °F (36.5 °C), Max:98.2 °F (36.8 °C)        Exam:  Patient without distress. Lungs:  CTA bilaterally               CV: RRR with no rubs or gallops; no murmur               Abdomen soft, fundus firm at level of umbilicus, nontender               Lower extremities are negative for cords or tenderness; tr swelling. Labs: No results found for this or any previous visit (from the past 24 hour(s)). Lab Results   Component Value Date/Time    Rubella, External Negative 2019    GrBStrep, External Negative 2019    HBsAg, External Negative 2019    HIV, External Negative 2019    RPR, External Non-Reactive 2013    Gonorrhea, External Negative 2019    Chlamydia, External Negative 2019       Assessment and Plan:    Postpartum Day #2, S/P . Doing well. BPs 130s-140s/80s-90s   - d/c home   - F/U 6wk postpartum check, sooner prn   - OTC Motrin   - getting BP cuff to monitor at home. Call if 150s/.   To ER if 160/105-110

## 2019-08-22 NOTE — LACTATION NOTE
This note was copied from a baby's chart. Mother and baby for discharge today. Mother states baby has been latching on well and breastfeeding well. She had just finished breastfeeding baby when  [facility] came to visit. LC reviewed the following:    Infant weight loss is -6.0%. Reviewed breastfeeding basics:  Supply and demand, breastfeed baby 8-12 times in 24 hr., feed baby on demand,  stomach size, early  Feeding cues, skin to skin, positioning and baby led latch-on, assymetrical latch with signs of good, deep latch vs shallow, feeding frequency and duration, and log sheet for tracking infant feedings and output. Breastfeeding Booklet and Warm line information given. Discussed typical  weight loss and the importance of infant weight checks with pediatrician 1-2 post discharge. Baby has a pediatric appointment tomorrow. Engorgement Care Guidelines:  Reviewed how milk is made and normal phases of milk production. Taught care of engorged breasts - frequent breastfeeding encouraged, cool packs and motrin as tolerated. Anticipatory guidance shared. Care for sore/tender nipples discussed:  ways to improve positioning and latch practiced and discussed, hand express colostrum after feedings and let air dry, light application of lanolin, hydrogel pads, seek comfortable laid back feeding position, start feedings on least sore side first.    Discussed eating a healthy diet. Instructed mother to eat a variety of foods in order to get a well balanced diet. She should consume an extra 500 calories per day (more than her non-pregnant requirement.) These extra calories will help provide energy needed for optimal breast milk production. Mother also encouraged to \"drink to thirst\" and it is recommended that she drink fluids such as water, fruit/vegetable juice. Nutritious snacks should be available so that she can eat throughout the day to help satisfy her hunger and maintain a good milk supply.     Mother will successfully establish breastfeeding by feeding in response to early feeding cues   or wake every 3h, will obtain deep latch, and will keep log of feedings/output. Taught to BF at hunger cues and or q 2-3 hrs and to offer 10-20 drops of hand expressed colostrum at any non-feeds. Breast Assessment  Left Breast: Medium  Left Nipple: Everted, Intact  Right Breast: Medium  Right Nipple: Everted, Intact  Breast- Feeding Assessment  Attends Breast-Feeding Classes: No  Breast-Feeding Experience: No(Attempted with 2 older children)  Breast Trauma/Surgery: No  Type/Quality: Good(Mother states baby breast fed well and cluster fed last night. Mother states latch feels much better since baby had her tongue clipped)  Lactation Consultant Visits  Breast-Feedings: (Mother and baby for discharge. Baby breast fed last at 0930 for 15 minutes. )    Chart shows numerous feedings, void, stool WNL. Discussed importance of monitoring outputs and feedings on first week of life. Discussed ways to tell if baby is  getting enough breast milk, ie  voids and stools, change in color of stool, and return to birth wt within 2 weeks. Follow up with pediatrician visit for weight check in 1-2 days (per AAP guidelines.)  Encouraged to call Warm Line  [phone]  for any questions/problems that arise.  Mother also given breastfeeding support group dates and times for any future needs

## 2020-07-20 ENCOUNTER — PATIENT MESSAGE (OUTPATIENT)
Dept: OBGYN CLINIC | Age: 30
End: 2020-07-20

## 2020-07-21 NOTE — PROGRESS NOTES
Suppression or prn? Can given Valtrex 500mg daily for suppression #30 no RF  -or-  Valtrex 500mg BID x3d prn #6 no RF  Overdue for AE. No additional RF until she is seen.

## 2020-07-28 RX ORDER — VALACYCLOVIR HYDROCHLORIDE 500 MG/1
500 TABLET, FILM COATED ORAL 2 TIMES DAILY
Qty: 60 TAB | Refills: 1 | Status: CANCELLED | OUTPATIENT
Start: 2020-07-28

## 2020-07-28 NOTE — TELEPHONE ENCOUNTER
Pt last seen 2/22/19 requesting Valtrex Rx. Pt advised of need to schedule an appointment. Will refill Rx until appointment date once appointment is made.

## 2020-08-07 ENCOUNTER — TELEPHONE (OUTPATIENT)
Dept: OBGYN CLINIC | Age: 30
End: 2020-08-07

## 2020-08-07 RX ORDER — VALACYCLOVIR HYDROCHLORIDE 500 MG/1
TABLET, FILM COATED ORAL
Qty: 30 TAB | Refills: 0 | Status: SHIPPED | OUTPATIENT
Start: 2020-08-07

## 2020-08-07 NOTE — TELEPHONE ENCOUNTER
Called to find out whether pt would like daily suppression or PRN Valtrex Rx. Pt would like Daily suppression. Tom HINOJOSA.

## 2020-09-14 ENCOUNTER — OFFICE VISIT (OUTPATIENT)
Dept: OBGYN CLINIC | Age: 30
End: 2020-09-14

## 2022-07-02 NOTE — PATIENT INSTRUCTIONS
